# Patient Record
Sex: FEMALE | Race: WHITE | NOT HISPANIC OR LATINO | Employment: PART TIME | URBAN - METROPOLITAN AREA
[De-identification: names, ages, dates, MRNs, and addresses within clinical notes are randomized per-mention and may not be internally consistent; named-entity substitution may affect disease eponyms.]

---

## 2019-04-12 ENCOUNTER — OFFICE VISIT (OUTPATIENT)
Dept: URGENT CARE | Facility: MEDICAL CENTER | Age: 19
End: 2019-04-12
Payer: COMMERCIAL

## 2019-04-12 VITALS
TEMPERATURE: 98.4 F | DIASTOLIC BLOOD PRESSURE: 80 MMHG | WEIGHT: 212 LBS | HEART RATE: 85 BPM | RESPIRATION RATE: 16 BRPM | HEIGHT: 62 IN | OXYGEN SATURATION: 99 % | BODY MASS INDEX: 39.01 KG/M2 | SYSTOLIC BLOOD PRESSURE: 133 MMHG

## 2019-04-12 DIAGNOSIS — B35.4 TINEA CORPORIS: Primary | ICD-10-CM

## 2019-04-12 PROCEDURE — S9083 URGENT CARE CENTER GLOBAL: HCPCS | Performed by: FAMILY MEDICINE

## 2019-04-12 PROCEDURE — G0383 LEV 4 HOSP TYPE B ED VISIT: HCPCS | Performed by: FAMILY MEDICINE

## 2019-04-12 RX ORDER — KETOCONAZOLE 20 MG/G
CREAM TOPICAL DAILY
Qty: 15 G | Refills: 0 | Status: SHIPPED | OUTPATIENT
Start: 2019-04-12 | End: 2020-11-12 | Stop reason: ALTCHOICE

## 2019-10-04 ENCOUNTER — OFFICE VISIT (OUTPATIENT)
Dept: URGENT CARE | Facility: MEDICAL CENTER | Age: 19
End: 2019-10-04
Payer: COMMERCIAL

## 2019-10-04 VITALS
WEIGHT: 225.31 LBS | TEMPERATURE: 98.3 F | RESPIRATION RATE: 18 BRPM | HEIGHT: 62 IN | DIASTOLIC BLOOD PRESSURE: 82 MMHG | SYSTOLIC BLOOD PRESSURE: 114 MMHG | OXYGEN SATURATION: 100 % | BODY MASS INDEX: 41.46 KG/M2 | HEART RATE: 89 BPM

## 2019-10-04 DIAGNOSIS — H60.11 CELLULITIS OF AURICLE OF RIGHT EAR: Primary | ICD-10-CM

## 2019-10-04 PROCEDURE — 99212 OFFICE O/P EST SF 10 MIN: CPT | Performed by: PHYSICIAN ASSISTANT

## 2019-10-04 RX ORDER — CEPHALEXIN 500 MG/1
500 CAPSULE ORAL EVERY 8 HOURS SCHEDULED
Qty: 30 CAPSULE | Refills: 0 | Status: SHIPPED | OUTPATIENT
Start: 2019-10-04 | End: 2019-10-14

## 2019-10-04 NOTE — PROGRESS NOTES
[facility]        NAME: Khanh Barrios is a 22 y o  female  : 2000    MRN: [id number]  DATE: 2019  TIME: 8:06 AM    Assessment and Plan   Cellulitis of auricle of right ear [H60 11]  1  Cellulitis of auricle of right ear  cephalexin (KEFLEX) 500 mg capsule         Patient Instructions       Follow up with PCP in 3-5 days  Chief Complaint     Chief Complaint   Patient presents with    Ear Problem     Pt states she has had right ear peircing for 3 years and on wednesday developed redness, pain and swelling  Denies any new jewerly or trauma to ear  History of Present Illness       Patient has had an ear piercing in her upper outer cartilage of the right ear for 3 years  She has discomfort intermittently but yesterday started with discomfort around a 3 to 4/10  She notice of increasing redness and when she removed that there was some purulence discharge  No fever chills  No other associated symptoms  She did put the earring back in  She continues with discomfort but did clean the area this morning  No drainage at the present time  Review of Systems   Review of Systems   All other systems reviewed and are negative  Current Medications       Current Outpatient Medications:     cephalexin (KEFLEX) 500 mg capsule, Take 1 capsule (500 mg total) by mouth every 8 (eight) hours for 10 days, Disp: 30 capsule, Rfl: 0    ketoconazole (NIZORAL) 2 % cream, Apply topically daily, Disp: 15 g, Rfl: 0    Current Allergies     Allergies as of 10/04/2019    (No Known Allergies)            The following portions of the patient's history were reviewed and updated as appropriate: allergies, current medications, past family history, past medical history, past social history, past surgical history and problem list      History reviewed  No pertinent past medical history  History reviewed  No pertinent surgical history  History reviewed   No pertinent family history  Medications have been verified  Objective   /82 (BP Location: Left arm, Patient Position: Sitting)   Pulse 89   Temp 98 3 °F (36 8 °C) (Tympanic Core)   Resp 18   Ht 5' 2" (1 575 m)   Wt 102 kg (225 lb 5 oz)   LMP  (LMP Unknown)   SpO2 100%   BMI 41 21 kg/m²        Physical Exam     Physical Exam   Constitutional: She appears well-developed and well-nourished  Cardiovascular: Normal rate, regular rhythm and normal heart sounds  Pulmonary/Chest: Effort normal and breath sounds normal    Lymphadenopathy:     She has no cervical adenopathy  Nursing note and vitals reviewed  Right outer cartilage ear piercing and surrounding is a 2 in area of erythema

## 2020-02-20 ENCOUNTER — APPOINTMENT (OUTPATIENT)
Dept: LAB | Facility: CLINIC | Age: 20
End: 2020-02-20

## 2020-02-20 ENCOUNTER — OCCMED (OUTPATIENT)
Dept: URGENT CARE | Facility: CLINIC | Age: 20
End: 2020-02-20

## 2020-02-20 DIAGNOSIS — Z02.1 ENCOUNTER FOR PRE-EMPLOYMENT HEALTH SCREENING EXAMINATION: Primary | ICD-10-CM

## 2020-02-20 DIAGNOSIS — Z02.1 ENCOUNTER FOR PRE-EMPLOYMENT HEALTH SCREENING EXAMINATION: ICD-10-CM

## 2020-02-20 PROCEDURE — 36415 COLL VENOUS BLD VENIPUNCTURE: CPT

## 2020-02-20 PROCEDURE — 86480 TB TEST CELL IMMUN MEASURE: CPT

## 2020-02-21 LAB
GAMMA INTERFERON BACKGROUND BLD IA-ACNC: 0.01 IU/ML
M TB IFN-G BLD-IMP: NEGATIVE
M TB IFN-G CD4+ BCKGRND COR BLD-ACNC: 0 IU/ML
M TB IFN-G CD4+ BCKGRND COR BLD-ACNC: 0 IU/ML
MITOGEN IGNF BCKGRD COR BLD-ACNC: 4.53 IU/ML

## 2020-04-22 ENCOUNTER — NURSE TRIAGE (OUTPATIENT)
Dept: OTHER | Facility: OTHER | Age: 20
End: 2020-04-22

## 2020-04-22 DIAGNOSIS — Z20.822 SUSPECTED COVID-19 VIRUS INFECTION: ICD-10-CM

## 2020-04-22 DIAGNOSIS — Z20.822 SUSPECTED COVID-19 VIRUS INFECTION: Primary | ICD-10-CM

## 2020-04-22 PROCEDURE — 87635 SARS-COV-2 COVID-19 AMP PRB: CPT

## 2020-04-23 LAB
INPATIENT: NORMAL
SARS-COV-2 RNA SPEC QL NAA+PROBE: NOT DETECTED

## 2020-04-24 ENCOUNTER — TELEPHONE (OUTPATIENT)
Dept: FAMILY MEDICINE CLINIC | Facility: OTHER | Age: 20
End: 2020-04-24

## 2020-10-24 ENCOUNTER — NURSE TRIAGE (OUTPATIENT)
Dept: OTHER | Facility: OTHER | Age: 20
End: 2020-10-24

## 2020-10-24 DIAGNOSIS — Z20.828 EXPOSURE TO SARS-ASSOCIATED CORONAVIRUS: ICD-10-CM

## 2020-10-24 DIAGNOSIS — Z20.828 EXPOSURE TO SARS-ASSOCIATED CORONAVIRUS: Primary | ICD-10-CM

## 2020-10-24 PROCEDURE — U0003 INFECTIOUS AGENT DETECTION BY NUCLEIC ACID (DNA OR RNA); SEVERE ACUTE RESPIRATORY SYNDROME CORONAVIRUS 2 (SARS-COV-2) (CORONAVIRUS DISEASE [COVID-19]), AMPLIFIED PROBE TECHNIQUE, MAKING USE OF HIGH THROUGHPUT TECHNOLOGIES AS DESCRIBED BY CMS-2020-01-R: HCPCS | Performed by: FAMILY MEDICINE

## 2020-10-27 ENCOUNTER — TELEPHONE (OUTPATIENT)
Dept: OTHER | Facility: OTHER | Age: 20
End: 2020-10-27

## 2020-10-27 LAB — SARS-COV-2 RNA SPEC QL NAA+PROBE: NOT DETECTED

## 2020-11-12 ENCOUNTER — OFFICE VISIT (OUTPATIENT)
Dept: INTERNAL MEDICINE CLINIC | Facility: CLINIC | Age: 20
End: 2020-11-12
Payer: COMMERCIAL

## 2020-11-12 VITALS
BODY MASS INDEX: 39.16 KG/M2 | TEMPERATURE: 97.4 F | WEIGHT: 212.8 LBS | OXYGEN SATURATION: 98 % | HEIGHT: 62 IN | SYSTOLIC BLOOD PRESSURE: 120 MMHG | RESPIRATION RATE: 18 BRPM | DIASTOLIC BLOOD PRESSURE: 78 MMHG | HEART RATE: 90 BPM

## 2020-11-12 DIAGNOSIS — Z23 NEED FOR IMMUNIZATION AGAINST INFLUENZA: ICD-10-CM

## 2020-11-12 DIAGNOSIS — Z00.00 ANNUAL PHYSICAL EXAM: Primary | ICD-10-CM

## 2020-11-12 PROCEDURE — 3008F BODY MASS INDEX DOCD: CPT | Performed by: NURSE PRACTITIONER

## 2020-11-12 PROCEDURE — 90686 IIV4 VACC NO PRSV 0.5 ML IM: CPT

## 2020-11-12 PROCEDURE — 3725F SCREEN DEPRESSION PERFORMED: CPT | Performed by: NURSE PRACTITIONER

## 2020-11-12 PROCEDURE — 99385 PREV VISIT NEW AGE 18-39: CPT | Performed by: NURSE PRACTITIONER

## 2020-11-12 PROCEDURE — 90471 IMMUNIZATION ADMIN: CPT

## 2020-11-12 PROCEDURE — 1036F TOBACCO NON-USER: CPT | Performed by: NURSE PRACTITIONER

## 2020-11-12 RX ORDER — NORGESTIMATE AND ETHINYL ESTRADIOL 7DAYSX3 LO
1 KIT ORAL DAILY
COMMUNITY
Start: 2020-10-09 | End: 2022-03-24

## 2020-11-12 RX ORDER — PHENOL 1.4 %
1 AEROSOL, SPRAY (ML) MUCOUS MEMBRANE
COMMUNITY

## 2020-12-22 ENCOUNTER — APPOINTMENT (OUTPATIENT)
Dept: LAB | Age: 20
End: 2020-12-22

## 2020-12-22 ENCOUNTER — LAB (OUTPATIENT)
Dept: LAB | Age: 20
End: 2020-12-22

## 2020-12-22 ENCOUNTER — TRANSCRIBE ORDERS (OUTPATIENT)
Dept: ADMINISTRATIVE | Age: 20
End: 2020-12-22

## 2020-12-22 DIAGNOSIS — Z02.0 ENCOUNTER FOR SCHOOL HEALTH EXAMINATION: ICD-10-CM

## 2020-12-22 DIAGNOSIS — Z02.0 ENCOUNTER FOR SCHOOL HEALTH EXAMINATION: Primary | ICD-10-CM

## 2020-12-22 PROCEDURE — 86480 TB TEST CELL IMMUN MEASURE: CPT

## 2020-12-23 LAB
GAMMA INTERFERON BACKGROUND BLD IA-ACNC: 0.04 IU/ML
M TB IFN-G BLD-IMP: NEGATIVE
M TB IFN-G CD4+ BCKGRND COR BLD-ACNC: -0.01 IU/ML
M TB IFN-G CD4+ BCKGRND COR BLD-ACNC: 0 IU/ML
MITOGEN IGNF BCKGRD COR BLD-ACNC: 7.19 IU/ML

## 2020-12-28 ENCOUNTER — IMMUNIZATIONS (OUTPATIENT)
Dept: FAMILY MEDICINE CLINIC | Facility: HOSPITAL | Age: 20
End: 2020-12-28
Payer: COMMERCIAL

## 2020-12-28 DIAGNOSIS — Z23 ENCOUNTER FOR IMMUNIZATION: ICD-10-CM

## 2020-12-28 PROCEDURE — 0011A SARS-COV-2 / COVID-19 MRNA VACCINE (MODERNA) 100 MCG: CPT

## 2020-12-28 PROCEDURE — 91301 SARS-COV-2 / COVID-19 MRNA VACCINE (MODERNA) 100 MCG: CPT

## 2021-01-23 ENCOUNTER — IMMUNIZATIONS (OUTPATIENT)
Dept: FAMILY MEDICINE CLINIC | Facility: HOSPITAL | Age: 21
End: 2021-01-23

## 2021-01-23 DIAGNOSIS — Z23 ENCOUNTER FOR IMMUNIZATION: Primary | ICD-10-CM

## 2021-01-23 PROCEDURE — 0012A SARS-COV-2 / COVID-19 MRNA VACCINE (MODERNA) 100 MCG: CPT

## 2021-01-23 PROCEDURE — 91301 SARS-COV-2 / COVID-19 MRNA VACCINE (MODERNA) 100 MCG: CPT

## 2021-05-10 ENCOUNTER — TELEMEDICINE (OUTPATIENT)
Dept: INTERNAL MEDICINE CLINIC | Facility: CLINIC | Age: 21
End: 2021-05-10
Payer: COMMERCIAL

## 2021-05-10 VITALS — HEIGHT: 62 IN | WEIGHT: 201.2 LBS | TEMPERATURE: 98.2 F | BODY MASS INDEX: 37.02 KG/M2

## 2021-05-10 DIAGNOSIS — Z20.822 EXPOSURE TO COVID-19 VIRUS: Primary | ICD-10-CM

## 2021-05-10 DIAGNOSIS — Z20.822 EXPOSURE TO COVID-19 VIRUS: ICD-10-CM

## 2021-05-10 PROCEDURE — 1036F TOBACCO NON-USER: CPT | Performed by: NURSE PRACTITIONER

## 2021-05-10 PROCEDURE — U0005 INFEC AGEN DETEC AMPLI PROBE: HCPCS | Performed by: NURSE PRACTITIONER

## 2021-05-10 PROCEDURE — 3008F BODY MASS INDEX DOCD: CPT | Performed by: NURSE PRACTITIONER

## 2021-05-10 PROCEDURE — 99213 OFFICE O/P EST LOW 20 MIN: CPT | Performed by: NURSE PRACTITIONER

## 2021-05-10 PROCEDURE — U0003 INFECTIOUS AGENT DETECTION BY NUCLEIC ACID (DNA OR RNA); SEVERE ACUTE RESPIRATORY SYNDROME CORONAVIRUS 2 (SARS-COV-2) (CORONAVIRUS DISEASE [COVID-19]), AMPLIFIED PROBE TECHNIQUE, MAKING USE OF HIGH THROUGHPUT TECHNOLOGIES AS DESCRIBED BY CMS-2020-01-R: HCPCS | Performed by: NURSE PRACTITIONER

## 2021-05-10 NOTE — PROGRESS NOTES
COVID-19 Outpatient Progress Note    Assessment/Plan:    Problem List Items Addressed This Visit        Other    Exposure to COVID-19 virus - Primary     Sx onset Saturday with diarrhea, overnight developed sore throat and headaches  Temp today was 99 2, now afebrile with tylenol on board  Positive cough but denies sob/burrell, chest tightness  She has a decreased appetite and diarrhea but no n/v   Still has sense of smell/taste intact  Contact with sister Friday who had cold symptoms, she has not been tested for covid  Will test pt and she understands to quarantine until test can be reviewed  She can continue tylenol prn, mucinex if she develops worsening congestion or sputum  Of note, fully vaccinated against covid as of January  Relevant Orders    Novel Coronavirus (Covid-19),PCR [facility] - Collected at [facility] or [facility]         Disposition:     I referred patient to one of our centralized sites for a COVID-19 swab  I have spent 15 minutes directly with the patient  Greater than 50% of this time was spent in counseling/coordination of care regarding: prognosis, risks and benefits of treatment options, instructions for management, patient and family education, risk factor reductions and impressions  Encounter provider Linh Treviño, [facility]    Provider located at [place] [phone] [facility] [address]    Recent Visits  No visits were found meeting these conditions  Showing recent visits within past 7 days and meeting all other requirements     Today's Visits  Date Type Provider Dept   05/10/21 Telemedicine Kat Ortega, [address] Internal [facility]   Showing today's visits and meeting all other requirements     Future Appointments  No visits were found meeting these conditions     Showing future appointments within next 150 days and meeting all other requirements      This virtual check-in was done via [employer] and patient was informed that this is a secure, HIPAA-compliant platform  She agrees to proceed  Patient agrees to participate in a virtual check in via telephone or video visit instead of presenting to the office to address urgent/immediate medical needs  Patient is aware this is a billable service  After connecting through [facility], the patient was identified by name and date of birth  John Block was informed that this was a telemedicine visit and that the exam was being conducted confidentially over secure lines  My office door was closed  No one else was in the room  John Block acknowledged consent and understanding of privacy and security of the telemedicine visit  I informed the patient that I have reviewed her record in Epic and presented the opportunity for her to ask any questions regarding the visit today  The patient agreed to participate  Subjective:   John Block is a 23 y o  female who is concerned about COVID-19  Patient's symptoms include chills, fatigue, sore throat, cough, diarrhea and headache  Patient denies fever, malaise, congestion, rhinorrhea, anosmia, loss of taste, shortness of breath, chest tightness, abdominal pain, nausea, vomiting and myalgias       Date of symptom onset: 5/8/2021    Exposure:   Contact with a person who is under investigation (PUI) for or who is positive for COVID-19 within the last 14 days?: No    Hospitalized recently for fever and/or lower respiratory symptoms?: No      Currently a healthcare worker that is involved in direct patient care?: Yes      Works in a special setting where the risk of COVID-19 transmission may be high? (this may include long-term care, correctional and [facility]CI facilities; homeless shelters; assisted-living facilities and group homes ): No      Resident in a special setting where the risk of COVID-19 transmission may be high? (this may include long-term care, correctional and [facility]CI facilities; homeless shelters; assisted-living facilities and group homes ): No      Lab Results   Component Value Date    [address] [place] [address] Not Detected 10/24/2020     Past Medical History:   Diagnosis Date    Headache(784 0)     Obesity     PCOS (polycystic ovarian syndrome)      Past Surgical History:   Procedure Laterality Date    TONSILLECTOMY AND ADENOIDECTOMY       Current Outpatient Medications   Medication Sig Dispense Refill    Melatonin 10 MG TABS Take 1 tablet by mouth daily at bedtime      metFORMIN (GLUCOPHAGE) 500 mg tablet Take 500 mg by mouth 2 (two) times a day      norgestimate-ethinyl estradiol (ORTHO TRI-CYCLEN LO) 0 18/0 215/0 25 MG-25 MCG per tablet Take 1 tablet by mouth daily       No current facility-administered medications for this visit  No Known Allergies    Review of Systems   Constitutional: Positive for chills and fatigue  Negative for fever  HENT: Positive for sore throat  Negative for congestion and rhinorrhea  Respiratory: Positive for cough  Negative for chest tightness and shortness of breath  Gastrointestinal: Positive for diarrhea  Negative for abdominal pain, nausea and vomiting  Musculoskeletal: Negative for myalgias  Neurological: Positive for headaches  Objective:    Vitals:    05/10/21 0932   Temp: 98 2 °F (36 8 °C)   Weight: 91 3 kg (201 lb 3 2 oz)   Height: 5' 2" (1 575 m)       Physical Exam  Vitals signs reviewed  Constitutional:       General: She is awake  She is not in acute distress  Appearance: Normal appearance  She is well-developed  She is not ill-appearing or toxic-appearing  Eyes:      General: Lids are normal       Conjunctiva/sclera: Conjunctivae normal    Pulmonary:      Effort: Pulmonary effort is normal  No tachypnea, accessory muscle usage or respiratory distress  Neurological:      Mental Status: She is alert and oriented to person, place, and time     Psychiatric:         Attention and Perception: Attention normal          Mood and Affect: Mood normal  Speech: Speech normal          Behavior: Behavior normal  Behavior is cooperative  Thought Content: Thought content normal          Cognition and Memory: Cognition normal          Judgment: Judgment normal        VIRTUAL VISIT DISCLAIMER    Milind Cook acknowledges that she has consented to an online visit or consultation  She understands that the online visit is based solely on information provided by her, and that, in the absence of a face-to-face physical evaluation by the physician, the diagnosis she receives is both limited and provisional in terms of accuracy and completeness  This is not intended to replace a full medical face-to-face evaluation by the physician  Milind Cook understands and accepts these terms

## 2021-05-10 NOTE — ASSESSMENT & PLAN NOTE
Sx onset Saturday with diarrhea, overnight developed sore throat and headaches  Temp today was 99 2, now afebrile with tylenol on board  Positive cough but denies sob/burrell, chest tightness  She has a decreased appetite and diarrhea but no n/v   Still has sense of smell/taste intact  Contact with sister Friday who had cold symptoms, she has not been tested for covid  Will test pt and she understands to quarantine until test can be reviewed  She can continue tylenol prn, mucinex if she develops worsening congestion or sputum  Of note, fully vaccinated against covid as of January

## 2021-05-11 LAB — SARS-COV-2 RNA RESP QL NAA+PROBE: NEGATIVE

## 2022-01-13 ENCOUNTER — TELEPHONE (OUTPATIENT)
Dept: INTERNAL MEDICINE CLINIC | Facility: CLINIC | Age: 22
End: 2022-01-13

## 2022-01-13 DIAGNOSIS — R05.9 COUGH: Primary | ICD-10-CM

## 2022-01-13 NOTE — TELEPHONE ENCOUNTER
Covid exposed live in  Cough, sore throat, congestion, sinus pressure, body aches, headaches, no fever-99,   Onset 99 0   Please put in for test

## 2022-01-14 PROCEDURE — U0005 INFEC AGEN DETEC AMPLI PROBE: HCPCS | Performed by: NURSE PRACTITIONER

## 2022-01-14 PROCEDURE — U0003 INFECTIOUS AGENT DETECTION BY NUCLEIC ACID (DNA OR RNA); SEVERE ACUTE RESPIRATORY SYNDROME CORONAVIRUS 2 (SARS-COV-2) (CORONAVIRUS DISEASE [COVID-19]), AMPLIFIED PROBE TECHNIQUE, MAKING USE OF HIGH THROUGHPUT TECHNOLOGIES AS DESCRIBED BY CMS-2020-01-R: HCPCS | Performed by: NURSE PRACTITIONER

## 2022-01-17 ENCOUNTER — TELEPHONE (OUTPATIENT)
Dept: INTERNAL MEDICINE CLINIC | Facility: CLINIC | Age: 22
End: 2022-01-17

## 2022-01-17 NOTE — TELEPHONE ENCOUNTER
I called patient and gave her the results and she does not want a virtual appointment and she said she is starting to feel better

## 2022-01-19 ENCOUNTER — TELEPHONE (OUTPATIENT)
Dept: INTERNAL MEDICINE CLINIC | Facility: CLINIC | Age: 22
End: 2022-01-19

## 2022-01-19 ENCOUNTER — TELEMEDICINE (OUTPATIENT)
Dept: INTERNAL MEDICINE CLINIC | Facility: CLINIC | Age: 22
End: 2022-01-19
Payer: COMMERCIAL

## 2022-01-19 VITALS — TEMPERATURE: 97.9 F

## 2022-01-19 DIAGNOSIS — U07.1 COVID: Primary | ICD-10-CM

## 2022-01-19 PROCEDURE — 99213 OFFICE O/P EST LOW 20 MIN: CPT | Performed by: INTERNAL MEDICINE

## 2022-01-19 PROCEDURE — 1036F TOBACCO NON-USER: CPT | Performed by: INTERNAL MEDICINE

## 2022-01-19 NOTE — ASSESSMENT & PLAN NOTE
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  Initially she was having some muscle aches and pains still some slight muscle aches in her neck  The also originally did have some problems with loose stools instill her stools are not completely formed at this point time  Most importantly she has residual cough which she states this point time is nonproductive no shortness of breath  She has no abdominal pain or cramps  She is taking over-the-counter cough medicine specifically Robitussin which she states does help  Appetite has improved  With the patient having COVID virus and being still symptomatic even though she has had the vaccine she was told that she cannot return to work until resolution of symptoms  Was told continue with keeping hydrated, maintaining her nutrition continue with the vitamin-C vitamin-D , zinc and suggested 81 mg aspirin daily with food    She will call the office if any new problems or concerns

## 2022-01-19 NOTE — PROGRESS NOTES
COVID-19 Outpatient Progress Note    Assessment/Plan:    Problem List Items Addressed This Visit     None         Disposition:     I recommended self-quarantine for 10 days and to watch for symptoms until 14 days after exposure  If patient were to develop symptoms, they should self isolate and call our office for further guidance  I have spent 20 minutes directly with the patient  Greater than 50% of this time was spent in counseling/coordination of care regarding: diagnostic results, prognosis, risks and benefits of treatment options, instructions for management, patient and family education, importance of treatment compliance, risk factor reductions and impressions  Encounter provider Vidal Avila DO    Provider located at [address] [address]    Recent Visits  Date Type Provider Dept   01/17/22 Telephone [facility], [address] Internal Med   01/13/22 Telephone [facility], [address] Internal [facility]   Showing recent visits within past 7 days and meeting all other requirements  Today's Visits  Date Type Provider Dept   01/19/22 Telemedicine Vidal Avila, [address] Internal Med   Showing today's visits and meeting all other requirements  Future Appointments  No visits were found meeting these conditions  Showing future appointments within next 150 days and meeting all other requirements     This virtual check-in was done via [employer] and patient was informed that this is a secure, HIPAA-compliant platform  She agrees to proceed  Patient agrees to participate in a virtual check in via telephone or video visit instead of presenting to the office to address urgent/immediate medical needs  Patient is aware this is a billable service  After connecting through [facility], the patient was identified by name and date of birth   Radha Shore was informed that this was a telemedicine visit and that the exam was being conducted confidentially over secure lines  Yumi Carlson acknowledged consent and understanding of privacy and security of the telemedicine visit  I informed the patient that I have reviewed her record in Epic and presented the opportunity for her to ask any questions regarding the visit today  The patient agreed to participate  Verification of patient location:  Patient is located in the following state in which I hold an active license: [place]    Subjective:   Yumi Carlson is a 19 y o  female who is concerned about COVID-19  Patient's symptoms include fatigue, nasal congestion, cough, diarrhea, myalgias and headache  Patient denies fever, chills, malaise, rhinorrhea, sore throat, anosmia, loss of taste, shortness of breath, chest tightness, abdominal pain, nausea and vomiting           COVID-19 vaccination status: Fully vaccinated (primary series)    Exposure:   Contact with a person who is under investigation (PUI) for or who is positive for COVID-19 within the last 14 days?: Yes    Hospitalized recently for fever and/or lower respiratory symptoms?: No      Currently a healthcare worker that is involved in direct patient care?: No      Works in a special setting where the risk of COVID-19 transmission may be high? (this may include long-term care, correctional and prison facilities; homeless shelters; assisted-living facilities and group homes ): No      Resident in a special setting where the risk of COVID-19 transmission may be high? (this may include long-term care, correctional and prison facilities; homeless shelters; assisted-living facilities and group homes ): No      Lab Results   Component Value Date    SARSCOV2 Positive (A) 01/14/2022    [address] [facility] 98 Not Detected 10/24/2020     Past Medical History:   Diagnosis Date    Headache(784 0)     Obesity     PCOS (polycystic ovarian syndrome)      Past Surgical History:   Procedure Laterality Date    TONSILLECTOMY AND ADENOIDECTOMY       Current Outpatient Medications   Medication Sig Dispense Refill    Melatonin 10 MG TABS Take 1 tablet by mouth daily at bedtime      metFORMIN (GLUCOPHAGE) 500 mg tablet Take 500 mg by mouth 2 (two) times a day      norgestimate-ethinyl estradiol (ORTHO TRI-CYCLEN LO) 0 18/0 215/0 25 MG-25 MCG per tablet Take 1 tablet by mouth daily       No current facility-administered medications for this visit  No Known Allergies    Review of Systems   Constitutional: Positive for fatigue  Negative for chills and fever  HENT: Positive for congestion  Negative for rhinorrhea and sore throat  Respiratory: Positive for cough  Negative for chest tightness and shortness of breath  Gastrointestinal: Positive for diarrhea  Negative for abdominal pain, nausea and vomiting  Musculoskeletal: Positive for myalgias  Neurological: Positive for headaches  Objective:    Vitals:    01/19/22 0909   Temp: 97 9 °F (36 6 °C)       Physical Exam  Vitals and nursing note reviewed  Constitutional:       General: She is not in acute distress  Appearance: Normal appearance  She is not ill-appearing, toxic-appearing or diaphoretic  Comments: The patient contacted by video telephone  Is awake alert sounds congested in no acute distress, articulate no respiratory compromise noted during exam other than persistent episodic cough   HENT:      Head: Normocephalic and atraumatic  Neurological:      Mental Status: She is alert and oriented to person, place, and time  Psychiatric:         Mood and Affect: Mood normal          Behavior: Behavior normal          Thought Content: Thought content normal          Judgment: Judgment normal          VIRTUAL VISIT DISCLAIMER    Soto Rickicinthia verbally agrees to participate in [employer]   Pt is aware that Virtual Care Services could be limited without vital signs or the ability to perform a full hands-on physical exam  Lety Schroeder understands she or the provider may request at any time to terminate the video visit and request the patient to seek care or treatment in person

## 2022-01-19 NOTE — TELEPHONE ENCOUNTER
Patient had a virtual today and she asked if we can please write a school note that states she was unable to return to school today due to symptoms and she has to remain out until she is symptom free  She asked if she can see it in my chart and if she cannot she asked if we can please email to [email or web address]    Thank you

## 2022-03-22 ENCOUNTER — NURSE TRIAGE (OUTPATIENT)
Dept: OTHER | Facility: OTHER | Age: 22
End: 2022-03-22

## 2022-03-23 ENCOUNTER — TELEPHONE (OUTPATIENT)
Dept: INTERNAL MEDICINE CLINIC | Facility: CLINIC | Age: 22
End: 2022-03-23

## 2022-03-23 NOTE — TELEPHONE ENCOUNTER
Regarding: gallbladder pain   ----- Message from Katelin Lino sent at 3/22/2022  7:56 PM EDT -----  " I maybe having an issue with my gallbladder, I would like to talk to someone to see if its serious "

## 2022-03-23 NOTE — TELEPHONE ENCOUNTER
Reason for Disposition   [1] MILD pain (e g , does not interfere with normal activities) AND [2] pain comes and goes (cramps) AND [3] present > 48 hours  (Exception: this same abdominal pain is a chronic symptom recurrent or ongoing AND present > 4 weeks)    Answer Assessment - Initial Assessment Questions  1  LOCATION: "Where does it hurt?"       Right upper abdomen, right below rib cage, has also felt pain under sternum     2  RADIATION: "Does the pain shoot anywhere else?" (e g , chest, back)      Unsure, has had upper and lower back pain for about a week     3  ONSET: "When did the pain begin?" (e g , minutes, hours or days ago)       Within the last 2 days but not as noticeable as today     4  SUDDEN: "Gradual or sudden onset?"      States it feels more sudden mainly today    5  PATTERN "Does the pain come and go, or is it constant?"     - If constant: "Is it getting better, staying the same, or worsening?"       (Note: Constant means the pain never goes away completely; most serious pain is constant and it progresses)      - If intermittent: "How long does it last?" "Do you have pain now?"      (Note: Intermittent means the pain goes away completely between bouts)        Dull stabbing pain every once in a while, has happened once or twice today     6  SEVERITY: "How bad is the pain?"  (e g , Scale 1-10; mild, moderate, or severe)    - MILD (1-3): doesn't interfere with normal activities, abdomen soft and not tender to touch     - MODERATE (4-7): interferes with normal activities or awakens from sleep, tender to touch     - SEVERE (8-10): excruciating pain, doubled over, unable to do any normal activities       2/10 when the pain occurs     7  RECURRENT SYMPTOM: "Have you ever had this type of stomach pain before?" If Yes, ask: "When was the last time?" and "What happened that time?"       Denies       8   RELIEVING/AGGRAVATING FACTORS: "What makes it better or worse?" (e g , movement, antacids, bowel movement)      Notices more when sitting still     9  OTHER SYMPTOMS: "Has there been any vomiting, diarrhea, constipation, or urine problems?"        Nausea the last couple of weeks, back pain, has also felt pain in lower abdomen not in relation to upper     Past week has been dealing with a lot of anxiety    10   PREGNANCY: "Is there any chance you are pregnant?" "When was your last menstrual period?"        Denies    Protocols used: ABDOMINAL PAIN - [facility]

## 2022-03-23 NOTE — TELEPHONE ENCOUNTER
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

## 2022-03-23 NOTE — TELEPHONE ENCOUNTER
Spoke with pt regarding symptoms of some abdominal pain and back pain  She does not feel sx are getting actively worse  She is unable to come to the office today, but would be available tomorrow  Will have staff call her to scheduled, reinforced that if anything worsens she should go to the urgent care

## 2022-03-24 ENCOUNTER — OFFICE VISIT (OUTPATIENT)
Dept: FAMILY MEDICINE CLINIC | Facility: CLINIC | Age: 22
End: 2022-03-24
Payer: COMMERCIAL

## 2022-03-24 VITALS
RESPIRATION RATE: 16 BRPM | HEIGHT: 62 IN | OXYGEN SATURATION: 99 % | HEART RATE: 104 BPM | DIASTOLIC BLOOD PRESSURE: 88 MMHG | TEMPERATURE: 98.9 F | BODY MASS INDEX: 40.23 KG/M2 | SYSTOLIC BLOOD PRESSURE: 130 MMHG | WEIGHT: 218.6 LBS

## 2022-03-24 DIAGNOSIS — F32.A ANXIETY AND DEPRESSION: Primary | ICD-10-CM

## 2022-03-24 DIAGNOSIS — Z13.220 SCREENING FOR LIPID DISORDERS: ICD-10-CM

## 2022-03-24 DIAGNOSIS — E28.2 PCOS (POLYCYSTIC OVARIAN SYNDROME): ICD-10-CM

## 2022-03-24 DIAGNOSIS — F41.9 ANXIETY AND DEPRESSION: Primary | ICD-10-CM

## 2022-03-24 DIAGNOSIS — R10.30 LOWER ABDOMINAL PAIN: ICD-10-CM

## 2022-03-24 PROCEDURE — 3725F SCREEN DEPRESSION PERFORMED: CPT | Performed by: NURSE PRACTITIONER

## 2022-03-24 PROCEDURE — 1036F TOBACCO NON-USER: CPT | Performed by: NURSE PRACTITIONER

## 2022-03-24 PROCEDURE — 99214 OFFICE O/P EST MOD 30 MIN: CPT | Performed by: NURSE PRACTITIONER

## 2022-03-24 PROCEDURE — 3008F BODY MASS INDEX DOCD: CPT | Performed by: NURSE PRACTITIONER

## 2022-03-24 RX ORDER — ESCITALOPRAM OXALATE 10 MG/1
10 TABLET ORAL DAILY
Qty: 30 TABLET | Refills: 0 | Status: SHIPPED | OUTPATIENT
Start: 2022-03-24 | End: 2022-05-06

## 2022-03-24 NOTE — ASSESSMENT & PLAN NOTE
Intermittent, LLQ, RLQ, sides of trunk  Not exacerbated by PO intake, normal exam   Will check cmp, cbc  Increased anxiety last week may have caused dysregulation of bowels which may have caused these intermittent pains  Will be treating anxiety as above, will f/u in 1 month  Pt knows to call if abd symptoms worsen

## 2022-03-24 NOTE — PROGRESS NOTES
Assessment/Plan:  BMI Counseling: Body mass index is 39 98 kg/m²  The BMI is above normal  Nutrition recommendations include decreasing portion sizes, encouraging healthy choices of fruits and vegetables, consuming healthier snacks, moderation in carbohydrate intake and increasing intake of lean protein  Exercise recommendations include moderate physical activity 150 minutes/week, exercising 3-5 times per week and strength training exercises  No pharmacotherapy was ordered  Rationale for BMI follow-up plan is due to patient being overweight or obese  Depression Screening and Follow-up Plan: Patient's depression screening was positive with a PHQ-2 score of 3  Their PHQ-9 score was 11  Patient assessed for underlying major depression  Brief counseling provided and recommend additional follow-up/re-evaluation next office visit  PCOS (polycystic ovarian syndrome)  Hx of PCOS, not currently treated  Gyn plans to initiate metformin but is awaiting review of her a1c  She asked if this could be ordered for her, order provided  F/u with gyn  Anxiety and depression  Increasing anxiety about health with her mother recently being diagnosed with colon cancer, positive PHQ score  Pt has been having abdominal pains that come and go, some back pain  May be 2/2 increased anxiety and the associated change in her appetite  She is interested in trying medication, will start her on escitalopram 10 mg, reviewed how to take, side effects  She understands it takes several weeks to reach effect  We will f/u in 1 month, she should call with questions or concerns  Lower abdominal pain  Intermittent, LLQ, RLQ, sides of trunk  Not exacerbated by PO intake, normal exam   Will check cmp, cbc  Increased anxiety last week may have caused dysregulation of bowels which may have caused these intermittent pains  Will be treating anxiety as above, will f/u in 1 month  Pt knows to call if abd symptoms worsen         Diagnoses and all orders for this visit:    Anxiety and depression  -     escitalopram (LEXAPRO) 10 mg tablet; Take 1 tablet (10 mg total) by mouth daily  -     Comprehensive metabolic panel; Future  -     CBC and differential; Future  -     TSH, 3rd generation with Free T4 reflex; Future    PCOS (polycystic ovarian syndrome)  -     Hemoglobin A1C; Future    Screening for lipid disorders  -     Lipid panel; Future    Lower abdominal pain          Subjective:      Patient ID: Krissy Magallanes is a 19 y o  female  Pt is a 20 yo female seen today for evaluation of abdominal pain and back pain  PMH includes obesity, PCOS  She notes increased anxiety and panic attacks last week, which has since subsided a bit  She started about 2 days ago with RUQ pain that has since resolved, but now she has a discomfort and pulling sensation bilateral sides of the trunk  She also has sharper pain in the R and L lower quadrants; these pains come and go, lasting a few seconds  She also has upper back pain bilaterally, described as soreness that is worse in certain postures  She did seem to be a bit constipated yesterday, noting small BM, she has had normal BM, this did not provide her with any noticeable relief  She noted nausea last week with the increased anxiety but it is back to normal   No vomiting  Appetite is normal currently  She denies increased belching or flatulence  No fever or chills  Denies urinary symptoms  She denies significant change in diet  The following portions of the patient's history were reviewed and updated as appropriate: allergies, current medications, past family history, past medical history, past social history, past surgical history and problem list     Review of Systems   Constitutional: Negative for appetite change, chills, diaphoresis, fatigue and fever  Respiratory: Negative for shortness of breath  Cardiovascular: Negative for chest pain and palpitations     Gastrointestinal: Positive for abdominal pain  Negative for abdominal distention, blood in stool, constipation, diarrhea, nausea and vomiting  Genitourinary: Negative for difficulty urinating, dysuria and frequency  Musculoskeletal: Positive for back pain  Psychiatric/Behavioral: Positive for dysphoric mood  The patient is nervous/anxious  Objective:      /88 (BP Location: Left arm)   Pulse 104   Temp 98 9 °F (37 2 °C)   Resp 16   Ht 5' 2" (1 575 m)   Wt 99 2 kg (218 lb 9 6 oz)   SpO2 99%   BMI 39 98 kg/m²          Physical Exam  Vitals reviewed  Constitutional:       General: She is awake  She is not in acute distress  Appearance: Normal appearance  She is well-developed and well-groomed  She is not ill-appearing  Cardiovascular:      Rate and Rhythm: Normal rate and regular rhythm  Pulses: Normal pulses  Heart sounds: Normal heart sounds  No murmur heard  Pulmonary:      Effort: Pulmonary effort is normal       Breath sounds: Normal breath sounds  Abdominal:      General: Bowel sounds are normal       Palpations: Abdomen is soft  Tenderness: There is no abdominal tenderness  There is no right CVA tenderness or left CVA tenderness  Skin:     General: Skin is warm and dry  Neurological:      Mental Status: She is alert and oriented to person, place, and time  Psychiatric:         Attention and Perception: Attention normal          Mood and Affect: Mood normal          Speech: Speech normal          Behavior: Behavior normal  Behavior is cooperative  Thought Content:  Thought content normal          Cognition and Memory: Cognition normal          Judgment: Judgment normal

## 2022-03-24 NOTE — ASSESSMENT & PLAN NOTE
Hx of PCOS, not currently treated  Gyn plans to initiate metformin but is awaiting review of her a1c  She asked if this could be ordered for her, order provided  F/u with gyn

## 2022-03-24 NOTE — ASSESSMENT & PLAN NOTE
Increasing anxiety about health with her mother recently being diagnosed with colon cancer, positive PHQ score  Pt has been having abdominal pains that come and go, some back pain  May be 2/2 increased anxiety and the associated change in her appetite  She is interested in trying medication, will start her on escitalopram 10 mg, reviewed how to take, side effects  She understands it takes several weeks to reach effect  We will f/u in 1 month, she should call with questions or concerns

## 2022-03-25 ENCOUNTER — TELEPHONE (OUTPATIENT)
Dept: ADMINISTRATIVE | Facility: OTHER | Age: 22
End: 2022-03-25

## 2022-03-25 NOTE — TELEPHONE ENCOUNTER
----- Message from Cornelius Lafleur sent at 3/24/2022  2:25 PM EDT -----  Pt is utd with cervical cancer screening, gyn is through [facility]

## 2022-03-25 NOTE — TELEPHONE ENCOUNTER
Upon review of the In Basket request we were able to locate, review, and update the patient chart as requested for Pap Smear (HPV) aka Cervical Cancer Screening  Any additional questions or concerns should be emailed to the Practice Liaisons via [email or web address] email, please do not reply via In Basket      Thank you  Finn Durand

## 2022-05-06 DIAGNOSIS — F41.9 ANXIETY AND DEPRESSION: ICD-10-CM

## 2022-05-06 DIAGNOSIS — F32.A ANXIETY AND DEPRESSION: ICD-10-CM

## 2022-05-06 RX ORDER — ESCITALOPRAM OXALATE 10 MG/1
TABLET ORAL
Qty: 30 TABLET | Refills: 0 | Status: SHIPPED | OUTPATIENT
Start: 2022-05-06 | End: 2022-06-10

## 2022-06-10 DIAGNOSIS — F32.A ANXIETY AND DEPRESSION: ICD-10-CM

## 2022-06-10 DIAGNOSIS — F41.9 ANXIETY AND DEPRESSION: ICD-10-CM

## 2022-06-10 RX ORDER — ESCITALOPRAM OXALATE 10 MG/1
TABLET ORAL
Qty: 30 TABLET | Refills: 0 | Status: SHIPPED | OUTPATIENT
Start: 2022-06-10 | End: 2022-07-14 | Stop reason: SDUPTHER

## 2022-07-13 NOTE — LETTER
[address] [facility]  [address] [facility] [address]  Phone#  [phone]  Fax#  [phone]      May 10, 2021     Patient: Sarah Cruz  YOB: 2000  Date of Last Encounter: 11/12/2020    To whom it may concern:    Sarah Cruz was seen for evaluation of covid symptoms today  She is being tested for covid and is advised to quarantine until her test result can be reviewed  She may return to work/ school if her test is negative as long as she remains afebrile       Sincerely,       MEDARDO Chand
Plan: Treated with [facility] W/ Dr. Reese
Detail Level: Zone

## 2022-07-14 DIAGNOSIS — F41.9 ANXIETY AND DEPRESSION: ICD-10-CM

## 2022-07-14 DIAGNOSIS — F32.A ANXIETY AND DEPRESSION: ICD-10-CM

## 2022-07-15 RX ORDER — ESCITALOPRAM OXALATE 10 MG/1
10 TABLET ORAL DAILY
Qty: 30 TABLET | Refills: 0 | Status: SHIPPED | OUTPATIENT
Start: 2022-07-15 | End: 2022-08-19

## 2022-08-19 DIAGNOSIS — F32.A ANXIETY AND DEPRESSION: ICD-10-CM

## 2022-08-19 DIAGNOSIS — F41.9 ANXIETY AND DEPRESSION: ICD-10-CM

## 2022-08-19 RX ORDER — ESCITALOPRAM OXALATE 10 MG/1
TABLET ORAL
Qty: 30 TABLET | Refills: 0 | Status: SHIPPED | OUTPATIENT
Start: 2022-08-19 | End: 2022-09-19 | Stop reason: SDUPTHER

## 2022-09-19 DIAGNOSIS — F41.9 ANXIETY AND DEPRESSION: ICD-10-CM

## 2022-09-19 DIAGNOSIS — F32.A ANXIETY AND DEPRESSION: ICD-10-CM

## 2022-09-19 RX ORDER — ESCITALOPRAM OXALATE 10 MG/1
10 TABLET ORAL DAILY
Qty: 30 TABLET | Refills: 0 | Status: SHIPPED | OUTPATIENT
Start: 2022-09-19 | End: 2022-10-25 | Stop reason: SDUPTHER

## 2022-09-23 ENCOUNTER — OFFICE VISIT (OUTPATIENT)
Dept: URGENT CARE | Age: 22
End: 2022-09-23
Payer: COMMERCIAL

## 2022-09-23 VITALS
RESPIRATION RATE: 20 BRPM | HEART RATE: 103 BPM | TEMPERATURE: 97.2 F | SYSTOLIC BLOOD PRESSURE: 124 MMHG | DIASTOLIC BLOOD PRESSURE: 86 MMHG | OXYGEN SATURATION: 99 %

## 2022-09-23 DIAGNOSIS — R21 RASH: Primary | ICD-10-CM

## 2022-09-23 PROCEDURE — 99213 OFFICE O/P EST LOW 20 MIN: CPT

## 2022-09-23 RX ORDER — METHYLPREDNISOLONE 4 MG/1
TABLET ORAL
Qty: 21 TABLET | Refills: 0 | Status: SHIPPED | OUTPATIENT
Start: 2022-09-23 | End: 2022-09-29

## 2022-09-24 NOTE — PROGRESS NOTES
[address]        NAME: Nikolay Chappell is a 23 y o  female  : 2000    MRN: [id number]  DATE: 2022  TIME: 8:37 PM    Assessment and Plan   Rash [R21]  1  Rash  methylPREDNISolone 4 MG tablet therapy pack         Patient Instructions     Steroid taper as discussed  Follow up with PCP in 3-5 days  Proceed to  ER if symptoms worsen  Chief Complaint     Chief Complaint   Patient presents with    Rash     Rash on abdomen and bilateral arms and patient notice it today  History of Present Illness       Patient presenting for evaluation of acute rash  She states that she noticed the rash under her arms and on her abdomen this morning  She denies any drainage or bleeding from the area, but states that it is pruritic  She denies any new lotions, soaps, detergents or medications, but states that she has a new job that she started this week and has to wear hospital issued scrubs  She denies any chest pain, shortness or breath, fever or chills  Rash  Pertinent negatives include no cough, fever, shortness of breath, sore throat or vomiting  Review of Systems   Review of Systems   Constitutional: Negative for chills and fever  HENT: Negative for ear pain and sore throat  Eyes: Negative for pain and visual disturbance  Respiratory: Negative for cough and shortness of breath  Cardiovascular: Negative for chest pain and palpitations  Gastrointestinal: Negative for abdominal pain and vomiting  Genitourinary: Negative for dysuria and hematuria  Musculoskeletal: Negative for arthralgias and back pain  Skin: Positive for rash  Negative for color change  Neurological: Negative for seizures and syncope  All other systems reviewed and are negative          Current Medications       Current Outpatient Medications:     methylPREDNISolone 4 MG tablet therapy pack, Take 6 tablets (24 mg total) by mouth daily for 1 day, THEN 5 tablets (20 mg total) daily for 1 day, THEN 4 tablets (16 mg total) daily for 1 day, THEN 3 tablets (12 mg total) daily for 1 day, THEN 2 tablets (8 mg total) daily for 1 day, THEN 1 tablet (4 mg total) daily for 1 day  Use as directed on package  , Disp: 21 tablet, Rfl: 0    escitalopram (LEXAPRO) 10 mg tablet, Take 1 tablet (10 mg total) by mouth daily, Disp: 30 tablet, Rfl: 0    Melatonin 10 MG TABS, Take 1 tablet by mouth daily at bedtime (Patient not taking: Reported on 3/24/2022 ), Disp: , Rfl:     metFORMIN (GLUCOPHAGE) 500 mg tablet, Take 500 mg by mouth 2 (two) times a day (Patient not taking: Reported on 3/24/2022 ), Disp: , Rfl:     norgestimate-ethinyl estradiol (ORTHO TRI-CYCLEN LO) 0 18/0 215/0 25 MG-25 MCG per tablet, Take 1 tablet by mouth daily, Disp: , Rfl:     Current Allergies     Allergies as of 09/23/2022    (No Known Allergies)            The following portions of the patient's history were reviewed and updated as appropriate: allergies, current medications, past family history, past medical history, past social history, past surgical history and problem list      Past Medical History:   Diagnosis Date    Headache(784 0)     Obesity     PCOS (polycystic ovarian syndrome)        Past Surgical History:   Procedure Laterality Date    TONSILLECTOMY AND ADENOIDECTOMY         Family History   Problem Relation Age of Onset    Endometriosis Mother     No Known Problems Father     No Known Problems Sister     No Known Problems Brother     No Known Problems Maternal Grandmother     Lung cancer Maternal Grandfather     No Known Problems Paternal Grandmother     No Known Problems Paternal Grandfather     No Known Problems Sister          Medications have been verified  Objective   /86   Pulse 103   Temp (!) 97 2 °F (36 2 °C) (Temporal)   Resp 20   SpO2 99%        Physical Exam     Physical Exam  Vitals and nursing note reviewed  Constitutional:       General: She is not in acute distress       Appearance: Normal appearance  She is not ill-appearing, toxic-appearing or diaphoretic  HENT:      Head: Normocephalic and atraumatic  Right Ear: Tympanic membrane normal       Left Ear: Tympanic membrane normal       Nose: Nose normal  No congestion or rhinorrhea  Mouth/Throat:      Mouth: Mucous membranes are moist       Pharynx: Oropharynx is clear  No oropharyngeal exudate or posterior oropharyngeal erythema  Eyes:      General:         Right eye: No discharge  Left eye: No discharge  Extraocular Movements: Extraocular movements intact  Conjunctiva/sclera: Conjunctivae normal       Pupils: Pupils are equal, round, and reactive to light  Cardiovascular:      Rate and Rhythm: Normal rate and regular rhythm  Pulses: Normal pulses  Heart sounds: Normal heart sounds  No murmur heard  No friction rub  No gallop  Pulmonary:      Effort: Pulmonary effort is normal  No respiratory distress  Breath sounds: Normal breath sounds  No stridor  No wheezing, rhonchi or rales  Chest:      Chest wall: No tenderness  Abdominal:      General: Abdomen is flat  Bowel sounds are normal       Palpations: Abdomen is soft  Tenderness: There is no abdominal tenderness  There is no guarding or rebound  Musculoskeletal:         General: No tenderness  Normal range of motion  Cervical back: Normal range of motion and neck supple  No tenderness  Skin:     General: Skin is warm and dry  Capillary Refill: Capillary refill takes less than 2 seconds  Findings: Rash present  Rash is papular  Neurological:      General: No focal deficit present  Mental Status: She is alert and oriented to person, place, and time     Psychiatric:         Mood and Affect: Mood normal          Behavior: Behavior normal

## 2022-10-05 ENCOUNTER — OFFICE VISIT (OUTPATIENT)
Dept: FAMILY MEDICINE CLINIC | Facility: CLINIC | Age: 22
End: 2022-10-05
Payer: COMMERCIAL

## 2022-10-05 VITALS
RESPIRATION RATE: 16 BRPM | WEIGHT: 230.4 LBS | HEART RATE: 96 BPM | SYSTOLIC BLOOD PRESSURE: 122 MMHG | HEIGHT: 62 IN | OXYGEN SATURATION: 97 % | BODY MASS INDEX: 42.4 KG/M2 | TEMPERATURE: 97.4 F | DIASTOLIC BLOOD PRESSURE: 90 MMHG

## 2022-10-05 DIAGNOSIS — L42 PITYRIASIS ROSEA: Primary | ICD-10-CM

## 2022-10-05 PROCEDURE — 99213 OFFICE O/P EST LOW 20 MIN: CPT | Performed by: NURSE PRACTITIONER

## 2022-10-05 RX ORDER — TRIAMCINOLONE ACETONIDE 1 MG/G
CREAM TOPICAL 2 TIMES DAILY
Qty: 30 G | Refills: 0 | Status: SHIPPED | OUTPATIENT
Start: 2022-10-05

## 2022-10-05 RX ORDER — HYDROXYZINE PAMOATE 25 MG/1
25 CAPSULE ORAL 3 TIMES DAILY PRN
Qty: 30 CAPSULE | Refills: 0 | Status: SHIPPED | OUTPATIENT
Start: 2022-10-05

## 2022-10-05 NOTE — ASSESSMENT & PLAN NOTE
Clinical course and exam most consistent with pityriasis rosea  Not responsive to PO steroid  Explained the course of the condition is self-limited and may take 4-6 weeks to clear  Will start hydroxyzine prn and can use kenalog cream as needed to itchy areas up to twice a day    Pt should call if sx worsen or persist

## 2022-10-05 NOTE — PROGRESS NOTES
Name: Sarah Cruz      : 2000      MRN: [id number]  Encounter Provider: MEDARDO Medina  Encounter Date: 10/5/2022   Encounter department: [place] [address]  Pityriasis rosea  Assessment & Plan:  Clinical course and exam most consistent with pityriasis rosea  Not responsive to PO steroid  Explained the course of the condition is self-limited and may take 4-6 weeks to clear  Will start hydroxyzine prn and can use kenalog cream as needed to itchy areas up to twice a day  Pt should call if sx worsen or persist     Orders:  -     hydrOXYzine pamoate (VISTARIL) 25 mg capsule; Take 1 capsule (25 mg total) by mouth 3 (three) times a day as needed for itching  -     triamcinolone (KENALOG) 0 1 % cream; Apply topically 2 (two) times a day           Subjective      Pt is a 23 y o  y/o female who is seen today for evaluation of rash  Seen  at urgent care for papular rash on abd and B arms  Treated with medrol dose pack  The steroid did not seem to help, she feels the rash has worsened, the papules are larger and she now also has some on her back  The rash itchy, non-tender  No drainage  She denies exposure to any new meds, detergents, skin care products, foods  Review of Systems   Constitutional: Negative for chills and fever  HENT: Negative for trouble swallowing  Respiratory: Negative for shortness of breath and wheezing  Skin: Positive for rash         Current Outpatient Medications on File Prior to Visit   Medication Sig    escitalopram (LEXAPRO) 10 mg tablet Take 1 tablet (10 mg total) by mouth daily    norgestimate-ethinyl estradiol (ORTHO TRI-CYCLEN LO) 0 18/0 215/0 25 MG-25 MCG per tablet Take 1 tablet by mouth daily    Melatonin 10 MG TABS Take 1 tablet by mouth daily at bedtime (Patient not taking: No sig reported)    metFORMIN (GLUCOPHAGE) 500 mg tablet Take 500 mg by mouth 2 (two) times a day (Patient not taking: No sig reported)       Objective     /90 (BP Location: Left arm, Patient Position: Sitting, Cuff Size: Large)   Pulse 96   Temp (!) 97 4 °F (36 3 °C) (Tympanic)   Resp 16   Ht 5' 2" (1 575 m)   Wt 105 kg (230 lb 6 4 oz)   SpO2 97%   BMI 42 14 kg/m²     Physical Exam  Vitals reviewed  Constitutional:       General: She is awake  She is not in acute distress  Appearance: Normal appearance  She is well-developed and well-groomed  She is not ill-appearing or diaphoretic  Eyes:      General: Lids are normal       Conjunctiva/sclera: Conjunctivae normal    Neck:      Vascular: No carotid bruit or JVD  Cardiovascular:      Rate and Rhythm: Normal rate and regular rhythm  Pulmonary:      Effort: Pulmonary effort is normal    Skin:     General: Skin is warm and dry  Findings: Rash present  Comments: Scattered salmon colored round/oval lesions on arms, abd, back with peeling skin and paler color centrally; there is a single larger lesion on the abdomen  Neurological:      Mental Status: She is alert and oriented to person, place, and time  Psychiatric:         Attention and Perception: Attention normal          Mood and Affect: Mood normal          Speech: Speech normal          Behavior: Behavior normal  Behavior is cooperative  Thought Content:  Thought content normal          Cognition and Memory: Cognition normal          Judgment: Judgment normal        MEDARDO Greco

## 2022-10-25 DIAGNOSIS — F41.9 ANXIETY AND DEPRESSION: ICD-10-CM

## 2022-10-25 DIAGNOSIS — F32.A ANXIETY AND DEPRESSION: ICD-10-CM

## 2022-10-26 RX ORDER — ESCITALOPRAM OXALATE 10 MG/1
10 TABLET ORAL DAILY
Qty: 30 TABLET | Refills: 0 | Status: SHIPPED | OUTPATIENT
Start: 2022-10-26

## 2022-12-15 ENCOUNTER — TELEPHONE (OUTPATIENT)
Dept: OTHER | Facility: OTHER | Age: 22
End: 2022-12-15

## 2022-12-15 NOTE — TELEPHONE ENCOUNTER
Patient is calling regarding rescheduling an appointment      Date/Time:12-19-22 @ 0900    Patient was rescheduled: YES [] NO [x]    Patient requesting call back to reschedule: YES [x] NO []

## 2022-12-30 ENCOUNTER — HOSPITAL ENCOUNTER (EMERGENCY)
Facility: HOSPITAL | Age: 22
Discharge: HOME | End: 2022-12-30
Attending: EMERGENCY MEDICINE

## 2022-12-30 ENCOUNTER — APPOINTMENT (EMERGENCY)
Dept: CT IMAGING | Facility: HOSPITAL | Age: 22
End: 2022-12-30

## 2022-12-30 ENCOUNTER — APPOINTMENT (EMERGENCY)
Dept: ULTRASOUND IMAGING | Facility: HOSPITAL | Age: 22
End: 2022-12-30

## 2022-12-30 ENCOUNTER — ANESTHESIA (EMERGENCY)
Dept: PERIOP | Facility: HOSPITAL | Age: 22
End: 2022-12-30

## 2022-12-30 ENCOUNTER — ANESTHESIA EVENT (EMERGENCY)
Dept: PERIOP | Facility: HOSPITAL | Age: 22
End: 2022-12-30

## 2022-12-30 VITALS
SYSTOLIC BLOOD PRESSURE: 118 MMHG | OXYGEN SATURATION: 96 % | RESPIRATION RATE: 20 BRPM | TEMPERATURE: 98 F | HEART RATE: 92 BPM | DIASTOLIC BLOOD PRESSURE: 67 MMHG

## 2022-12-30 DIAGNOSIS — R10.11 RUQ PAIN: ICD-10-CM

## 2022-12-30 DIAGNOSIS — K80.20 CALCULUS OF GALLBLADDER WITHOUT CHOLECYSTITIS WITHOUT OBSTRUCTION: ICD-10-CM

## 2022-12-30 DIAGNOSIS — K80.10 CALCULUS OF GALLBLADDER WITH CHRONIC CHOLECYSTITIS WITHOUT OBSTRUCTION: Primary | ICD-10-CM

## 2022-12-30 PROBLEM — K21.9 GASTROESOPHAGEAL REFLUX DISEASE: Status: ACTIVE | Noted: 2022-12-30

## 2022-12-30 PROBLEM — K44.9 HIATAL HERNIA: Status: ACTIVE | Noted: 2022-12-30

## 2022-12-30 LAB
ALBUMIN SERPL BCP-MCNC: 3.7 G/DL (ref 3.5–5)
ALP SERPL-CCNC: 64 U/L (ref 34–104)
ALT SERPL W P-5'-P-CCNC: 10 U/L (ref 7–52)
ANION GAP SERPL CALCULATED.3IONS-SCNC: 9 MMOL/L (ref 4–13)
AST SERPL W P-5'-P-CCNC: 12 U/L (ref 13–39)
BACTERIA UR QL AUTO: NORMAL /HPF
BASOPHILS # BLD AUTO: 0.03 THOUSANDS/ÂΜL (ref 0–0.1)
BASOPHILS NFR BLD AUTO: 0 % (ref 0–1)
BILIRUB SERPL-MCNC: 0.28 MG/DL (ref 0.2–1)
BILIRUB UR QL STRIP: NEGATIVE
BUN SERPL-MCNC: 10 MG/DL (ref 5–25)
CALCIUM SERPL-MCNC: 8.8 MG/DL (ref 8.4–10.2)
CHLORIDE SERPL-SCNC: 105 MMOL/L (ref 96–108)
CLARITY UR: CLEAR
CO2 SERPL-SCNC: 23 MMOL/L (ref 21–32)
COLOR UR: ABNORMAL
CREAT SERPL-MCNC: 0.61 MG/DL (ref 0.6–1.3)
EOSINOPHIL # BLD AUTO: 0.08 THOUSAND/ÂΜL (ref 0–0.61)
EOSINOPHIL NFR BLD AUTO: 1 % (ref 0–6)
ERYTHROCYTE [DISTWIDTH] IN BLOOD BY AUTOMATED COUNT: 13.3 % (ref 11.6–15.1)
EXT PREGNANCY TEST URINE: NEGATIVE
EXT. CONTROL: NORMAL
GFR SERPL CREATININE-BSD FRML MDRD: 127 ML/MIN/1.73SQ M
GLUCOSE SERPL-MCNC: 112 MG/DL (ref 65–140)
GLUCOSE UR STRIP-MCNC: NEGATIVE MG/DL
HCT VFR BLD AUTO: 39 % (ref 34.8–46.1)
HGB BLD-MCNC: 12.6 G/DL (ref 11.5–15.4)
HGB UR QL STRIP.AUTO: NEGATIVE
IMM GRANULOCYTES # BLD AUTO: 0.04 THOUSAND/UL (ref 0–0.2)
IMM GRANULOCYTES NFR BLD AUTO: 1 % (ref 0–2)
KETONES UR STRIP-MCNC: NEGATIVE MG/DL
LEUKOCYTE ESTERASE UR QL STRIP: NEGATIVE
LIPASE SERPL-CCNC: 12 U/L (ref 11–82)
LYMPHOCYTES # BLD AUTO: 2.9 THOUSANDS/ÂΜL (ref 0.6–4.47)
LYMPHOCYTES NFR BLD AUTO: 33 % (ref 14–44)
MCH RBC QN AUTO: 28.5 PG (ref 26.8–34.3)
MCHC RBC AUTO-ENTMCNC: 32.3 G/DL (ref 31.4–37.4)
MCV RBC AUTO: 88 FL (ref 82–98)
MONOCYTES # BLD AUTO: 0.4 THOUSAND/ÂΜL (ref 0.17–1.22)
MONOCYTES NFR BLD AUTO: 5 % (ref 4–12)
NEUTROPHILS # BLD AUTO: 5.27 THOUSANDS/ÂΜL (ref 1.85–7.62)
NEUTS SEG NFR BLD AUTO: 60 % (ref 43–75)
NITRITE UR QL STRIP: NEGATIVE
NON-SQ EPI CELLS URNS QL MICRO: NORMAL /HPF
NRBC BLD AUTO-RTO: 0 /100 WBCS
PH UR STRIP.AUTO: 6 [PH]
PLATELET # BLD AUTO: 333 THOUSANDS/UL (ref 149–390)
PMV BLD AUTO: 9.3 FL (ref 8.9–12.7)
POTASSIUM SERPL-SCNC: 4 MMOL/L (ref 3.5–5.3)
PROT SERPL-MCNC: 6.9 G/DL (ref 6.4–8.4)
PROT UR STRIP-MCNC: ABNORMAL MG/DL
RBC # BLD AUTO: 4.42 MILLION/UL (ref 3.81–5.12)
RBC #/AREA URNS AUTO: NORMAL /HPF
SODIUM SERPL-SCNC: 137 MMOL/L (ref 135–147)
SP GR UR STRIP.AUTO: 1.02 (ref 1–1.03)
UROBILINOGEN UR STRIP-ACNC: <2 MG/DL
WBC # BLD AUTO: 8.72 THOUSAND/UL (ref 4.31–10.16)
WBC #/AREA URNS AUTO: NORMAL /HPF

## 2022-12-30 RX ORDER — HYDROMORPHONE HCL/PF 1 MG/ML
0.5 SYRINGE (ML) INJECTION
Status: DISCONTINUED | OUTPATIENT
Start: 2022-12-30 | End: 2022-12-30 | Stop reason: HOSPADM

## 2022-12-30 RX ORDER — ONDANSETRON 2 MG/ML
4 INJECTION INTRAMUSCULAR; INTRAVENOUS EVERY 4 HOURS PRN
Status: DISCONTINUED | OUTPATIENT
Start: 2022-12-30 | End: 2022-12-30 | Stop reason: HOSPADM

## 2022-12-30 RX ORDER — BUPIVACAINE HYDROCHLORIDE AND EPINEPHRINE 2.5; 5 MG/ML; UG/ML
INJECTION, SOLUTION EPIDURAL; INFILTRATION; INTRACAUDAL; PERINEURAL AS NEEDED
Status: DISCONTINUED | OUTPATIENT
Start: 2022-12-30 | End: 2022-12-30 | Stop reason: HOSPADM

## 2022-12-30 RX ORDER — OXYCODONE HYDROCHLORIDE 5 MG/1
5 TABLET ORAL EVERY 4 HOURS PRN
Qty: 10 TABLET | Refills: 0 | Status: SHIPPED | OUTPATIENT
Start: 2022-12-30 | End: 2023-01-09

## 2022-12-30 RX ORDER — ACETAMINOPHEN 325 MG/1
975 TABLET ORAL ONCE
Status: COMPLETED | OUTPATIENT
Start: 2022-12-30 | End: 2022-12-30

## 2022-12-30 RX ORDER — HYDROMORPHONE HCL IN WATER/PF 6 MG/30 ML
0.2 PATIENT CONTROLLED ANALGESIA SYRINGE INTRAVENOUS
Status: DISCONTINUED | OUTPATIENT
Start: 2022-12-30 | End: 2022-12-30 | Stop reason: HOSPADM

## 2022-12-30 RX ORDER — ROCURONIUM BROMIDE 10 MG/ML
INJECTION, SOLUTION INTRAVENOUS AS NEEDED
Status: DISCONTINUED | OUTPATIENT
Start: 2022-12-30 | End: 2022-12-30

## 2022-12-30 RX ORDER — MAGNESIUM HYDROXIDE 1200 MG/15ML
LIQUID ORAL AS NEEDED
Status: DISCONTINUED | OUTPATIENT
Start: 2022-12-30 | End: 2022-12-30 | Stop reason: HOSPADM

## 2022-12-30 RX ORDER — GLYCOPYRROLATE 0.2 MG/ML
INJECTION INTRAMUSCULAR; INTRAVENOUS AS NEEDED
Status: DISCONTINUED | OUTPATIENT
Start: 2022-12-30 | End: 2022-12-30

## 2022-12-30 RX ORDER — DEXAMETHASONE SODIUM PHOSPHATE 10 MG/ML
INJECTION, SOLUTION INTRAMUSCULAR; INTRAVENOUS AS NEEDED
Status: DISCONTINUED | OUTPATIENT
Start: 2022-12-30 | End: 2022-12-30

## 2022-12-30 RX ORDER — SODIUM CHLORIDE, SODIUM LACTATE, POTASSIUM CHLORIDE, CALCIUM CHLORIDE 600; 310; 30; 20 MG/100ML; MG/100ML; MG/100ML; MG/100ML
INJECTION, SOLUTION INTRAVENOUS CONTINUOUS PRN
Status: DISCONTINUED | OUTPATIENT
Start: 2022-12-30 | End: 2022-12-30

## 2022-12-30 RX ORDER — OXYCODONE HYDROCHLORIDE 5 MG/1
5 TABLET ORAL EVERY 4 HOURS PRN
Status: DISCONTINUED | OUTPATIENT
Start: 2022-12-30 | End: 2022-12-30 | Stop reason: HOSPADM

## 2022-12-30 RX ORDER — CEFAZOLIN SODIUM 2 G/50ML
2000 SOLUTION INTRAVENOUS
Status: COMPLETED | OUTPATIENT
Start: 2022-12-30 | End: 2022-12-30

## 2022-12-30 RX ORDER — FENTANYL CITRATE 50 UG/ML
INJECTION, SOLUTION INTRAMUSCULAR; INTRAVENOUS AS NEEDED
Status: DISCONTINUED | OUTPATIENT
Start: 2022-12-30 | End: 2022-12-30

## 2022-12-30 RX ORDER — ONDANSETRON 2 MG/ML
4 INJECTION INTRAMUSCULAR; INTRAVENOUS ONCE AS NEEDED
Status: COMPLETED | OUTPATIENT
Start: 2022-12-30 | End: 2022-12-30

## 2022-12-30 RX ORDER — METRONIDAZOLE 500 MG/100ML
500 INJECTION, SOLUTION INTRAVENOUS ONCE
Status: COMPLETED | OUTPATIENT
Start: 2022-12-30 | End: 2022-12-30

## 2022-12-30 RX ORDER — FENTANYL CITRATE/PF 50 MCG/ML
25 SYRINGE (ML) INJECTION
Status: DISCONTINUED | OUTPATIENT
Start: 2022-12-30 | End: 2022-12-30 | Stop reason: HOSPADM

## 2022-12-30 RX ORDER — ONDANSETRON 2 MG/ML
INJECTION INTRAMUSCULAR; INTRAVENOUS AS NEEDED
Status: DISCONTINUED | OUTPATIENT
Start: 2022-12-30 | End: 2022-12-30

## 2022-12-30 RX ORDER — SODIUM CHLORIDE 9 MG/ML
100 INJECTION, SOLUTION INTRAVENOUS CONTINUOUS
Status: DISCONTINUED | OUTPATIENT
Start: 2022-12-30 | End: 2022-12-30 | Stop reason: HOSPADM

## 2022-12-30 RX ORDER — SODIUM CHLORIDE, SODIUM LACTATE, POTASSIUM CHLORIDE, CALCIUM CHLORIDE 600; 310; 30; 20 MG/100ML; MG/100ML; MG/100ML; MG/100ML
125 INJECTION, SOLUTION INTRAVENOUS CONTINUOUS
Status: DISCONTINUED | OUTPATIENT
Start: 2022-12-30 | End: 2022-12-30 | Stop reason: HOSPADM

## 2022-12-30 RX ORDER — KETOROLAC TROMETHAMINE 30 MG/ML
INJECTION, SOLUTION INTRAMUSCULAR; INTRAVENOUS AS NEEDED
Status: DISCONTINUED | OUTPATIENT
Start: 2022-12-30 | End: 2022-12-30

## 2022-12-30 RX ORDER — LIDOCAINE HYDROCHLORIDE 10 MG/ML
INJECTION, SOLUTION EPIDURAL; INFILTRATION; INTRACAUDAL; PERINEURAL AS NEEDED
Status: DISCONTINUED | OUTPATIENT
Start: 2022-12-30 | End: 2022-12-30

## 2022-12-30 RX ORDER — MIDAZOLAM HYDROCHLORIDE 2 MG/2ML
INJECTION, SOLUTION INTRAMUSCULAR; INTRAVENOUS AS NEEDED
Status: DISCONTINUED | OUTPATIENT
Start: 2022-12-30 | End: 2022-12-30

## 2022-12-30 RX ORDER — DEXMEDETOMIDINE HYDROCHLORIDE 100 UG/ML
INJECTION, SOLUTION INTRAVENOUS AS NEEDED
Status: DISCONTINUED | OUTPATIENT
Start: 2022-12-30 | End: 2022-12-30

## 2022-12-30 RX ORDER — PROPOFOL 10 MG/ML
INJECTION, EMULSION INTRAVENOUS AS NEEDED
Status: DISCONTINUED | OUTPATIENT
Start: 2022-12-30 | End: 2022-12-30

## 2022-12-30 RX ADMIN — ROCURONIUM BROMIDE 50 MG: 10 SOLUTION INTRAVENOUS at 12:52

## 2022-12-30 RX ADMIN — FENTANYL CITRATE 25 MCG: 50 INJECTION INTRAMUSCULAR; INTRAVENOUS at 14:18

## 2022-12-30 RX ADMIN — DEXMEDETOMIDINE HYDROCHLORIDE 8 MCG: 100 INJECTION, SOLUTION INTRAVENOUS at 12:49

## 2022-12-30 RX ADMIN — SODIUM CHLORIDE 100 ML/HR: 0.9 INJECTION, SOLUTION INTRAVENOUS at 10:26

## 2022-12-30 RX ADMIN — FENTANYL CITRATE 50 MCG: 50 INJECTION, SOLUTION INTRAMUSCULAR; INTRAVENOUS at 12:58

## 2022-12-30 RX ADMIN — LIDOCAINE HYDROCHLORIDE 50 MG: 10 INJECTION, SOLUTION EPIDURAL; INFILTRATION; INTRACAUDAL at 12:50

## 2022-12-30 RX ADMIN — ACETAMINOPHEN 975 MG: 325 TABLET ORAL at 08:15

## 2022-12-30 RX ADMIN — KETOROLAC TROMETHAMINE 15 MG: 30 INJECTION, SOLUTION INTRAMUSCULAR at 13:24

## 2022-12-30 RX ADMIN — ONDANSETRON 4 MG: 2 INJECTION INTRAMUSCULAR; INTRAVENOUS at 14:06

## 2022-12-30 RX ADMIN — CEFAZOLIN SODIUM 2000 MG: 2 SOLUTION INTRAVENOUS at 12:45

## 2022-12-30 RX ADMIN — METRONIDAZOLE 500 MG: 500 INJECTION, SOLUTION INTRAVENOUS at 10:26

## 2022-12-30 RX ADMIN — SUGAMMADEX 200 MG: 100 INJECTION, SOLUTION INTRAVENOUS at 13:39

## 2022-12-30 RX ADMIN — PROPOFOL 200 MG: 10 INJECTION, EMULSION INTRAVENOUS at 12:52

## 2022-12-30 RX ADMIN — DEXMEDETOMIDINE HYDROCHLORIDE 4 MCG: 100 INJECTION, SOLUTION INTRAVENOUS at 13:20

## 2022-12-30 RX ADMIN — FENTANYL CITRATE 50 MCG: 50 INJECTION, SOLUTION INTRAMUSCULAR; INTRAVENOUS at 12:52

## 2022-12-30 RX ADMIN — ONDANSETRON 4 MG: 2 INJECTION INTRAMUSCULAR; INTRAVENOUS at 13:08

## 2022-12-30 RX ADMIN — SODIUM CHLORIDE, SODIUM LACTATE, POTASSIUM CHLORIDE, AND CALCIUM CHLORIDE: .6; .31; .03; .02 INJECTION, SOLUTION INTRAVENOUS at 13:37

## 2022-12-30 RX ADMIN — MIDAZOLAM HYDROCHLORIDE 2 MG: 1 INJECTION, SOLUTION INTRAMUSCULAR; INTRAVENOUS at 12:45

## 2022-12-30 RX ADMIN — DEXMEDETOMIDINE HYDROCHLORIDE 8 MCG: 100 INJECTION, SOLUTION INTRAVENOUS at 13:00

## 2022-12-30 RX ADMIN — OXYCODONE HYDROCHLORIDE 5 MG: 5 TABLET ORAL at 14:54

## 2022-12-30 RX ADMIN — FENTANYL CITRATE 50 MCG: 50 INJECTION, SOLUTION INTRAMUSCULAR; INTRAVENOUS at 13:50

## 2022-12-30 RX ADMIN — FENTANYL CITRATE 25 MCG: 50 INJECTION INTRAMUSCULAR; INTRAVENOUS at 14:08

## 2022-12-30 RX ADMIN — IOHEXOL 100 ML: 350 INJECTION, SOLUTION INTRAVENOUS at 08:32

## 2022-12-30 RX ADMIN — GLYCOPYRROLATE 0.1 MG: 0.2 INJECTION, SOLUTION INTRAMUSCULAR; INTRAVENOUS at 13:01

## 2022-12-30 RX ADMIN — DEXAMETHASONE SODIUM PHOSPHATE 10 MG: 10 INJECTION, SOLUTION INTRAMUSCULAR; INTRAVENOUS at 13:00

## 2022-12-30 NOTE — ED ATTENDING ATTESTATION
12/30/2022  ITrini, DO, saw and evaluated the patient  I have discussed the patient with the resident/non-physician practitioner and agree with the resident's/non-physician practitioner's findings, Plan of Care, and MDM as documented in the resident's/non-physician practitioner's note, except where noted  All available labs and Radiology studies were reviewed  I was present for key portions of any procedure(s) performed by the resident/non-physician practitioner and I was immediately available to provide assistance  At this point I agree with the current assessment done in the Emergency Department  I have conducted an independent evaluation of this patient a history and physical is as follows:    ED Course   23year old female presents with RUQ pain x several hours  Pain has been waxing and waning for "months", worse this am   Patient has been having diarrhea x months, non-bloody  Pain correlates with food intake and worsens with eating  Eating also triggers loose stools  Pt  Also reports body aches and joint for > 1 month  Patient also reports having chronic right lower quadrant pain which she feels that has gradually worsened over time  She has a history of PCOS and feels like she might have a cyst in the right lower quadrant  Wt Readings from Last 3 Encounters:   10/05/22 105 kg (230 lb 6 4 oz)   03/24/22 99 2 kg (218 lb 9 6 oz)   05/10/21 91 3 kg (201 lb 3 2 oz)     PmhX: PCOS    PE:  VSS, patient is anxious and tearful  Mucous membranes are moist   Neck is supple  Heart is regular rate and rhythm without ectopy  Clear to auscultation bilateral   Abdomen is soft, tender in the right upper quadrant  Tenderness in the right lower quadrant  No rebound or guarding,  No cva tenderness    Plan: 38-year-old female presents with recurrent right upper quadrant pain  Ultrasound demonstrates cholelithiasis  Will consult surgery to valuate for possible treatment of symptomatic cholelithiasis  Patient without signs of acute cholecystitis      Critical Care Time  Procedures

## 2022-12-30 NOTE — ANESTHESIA PREPROCEDURE EVALUATION
Procedure:  CHOLECYSTECTOMY LAPAROSCOPIC (Abdomen)    Relevant Problems   GI/HEPATIC   (+) Gastroesophageal reflux disease   (+) Hiatal hernia      NEURO/PSYCH   (+) Anxiety and depression (no meds currently)      Endocrine   (+) PCOS (polycystic ovarian syndrome)      Other   (+) Obesity        Physical Exam    Airway    Mallampati score: II  TM Distance: >3 FB  Neck ROM: full     Dental       Cardiovascular  Rhythm: regular, Rate: normal,     Pulmonary  Breath sounds clear to auscultation,     Other Findings        Anesthesia Plan  ASA Score- 3     Anesthesia Type- general with ASA Monitors  Additional Monitors:   Airway Plan: ETT  Plan Factors-    Chart reviewed  Patient is not a current smoker  Induction- intravenous  Postoperative Plan- Plan for postoperative opioid use  Informed Consent- Anesthetic plan and risks discussed with patient  I personally reviewed this patient with the CRNA  Discussed and agreed on the Anesthesia Plan with the CRNA  Bronson Talbot

## 2022-12-30 NOTE — H&P
Consultation - General Surgery   Alvaro Bryson 23 y o  female MRN: [id number]  Unit/Bed#: ED-02 Encounter: [id number]    ASSESSMENT:  22 y/o female with no sig PMH other than PCOS who presents with 6 months of post-prandial RUQ, which has worsened in severity and became more constant over the last 24 hrs  Biliary colic  Cholelithiasis   AF, hypertensive/tachycardiac on admission likely 2/2 pain/anxiety, vitals improved after analgesia  No leukocytosis, WBC 8 72  LFTs WNL  UA/BHCG negative   CTAP: incidental finding of hiatal hernia, no gallstones, normal GB wall thickness, no inflammatory changes, no biliary duct dilation  RUQ US: Cholelithasis, no sludge, no signs of acute cholecystitis    PLAN:   Add on laparoscopic cholecystectomy for treatment of her acute on chronic worsening biliary colic  Plan for d/c same day  Case request made  Written/verbal consent obtain  All questions answered  NPO  IVF  Analgesia/antiemetics prn  Pre-op abx ppx  DVT ppx: SCDs/ambulation    ----------------------------------------------------------------------------------------------------------------------  SUBJECTIVE:    Chief Complaint: RUQ pain, nausea, diarrhea    HPI:  Alvaro Bryson is a 23 y o  female with PMH PCOS who presents with worsening RUQ pain  Described as dull aching RUQ post-prandial pain for the last 6 months  Over the last 24 hrs pain became more severe/constant  Worsen with laying down or palpation  Associated nausea, no vomiting  + yellow diarrhea (has been occurring with the pain, now after every meal, no specific meals are a precipitating factor) Currently abdominal pain still constant, mild relief with PO Tylenol, no nausea       PMH: PCOS  PSH: tonsilectomy/adenoidectomy, no abdominal surg  Meds: no medications, weaned of Lexapro recently  Allergies: NKA  FHx: + GB dz grandmother  SHx: 1x month social ETOH use, no tobacco/drug use    ROS:  Review of Systems   Constitutional: Negative for activity change, appetite change, chills, fever and unexpected weight change  HENT: Negative  Eyes: Negative  Respiratory: Negative  Cardiovascular: Negative  Gastrointestinal: Positive for abdominal pain, diarrhea and nausea  Negative for abdominal distention, blood in stool, constipation and vomiting  Acid reflux    Genitourinary: Negative for difficulty urinating, dysuria and hematuria  Musculoskeletal: Negative  Skin: Negative  Neurological: Negative for dizziness, weakness, light-headedness and headaches  Psychiatric/Behavioral: Negative  Historical Information   Past Medical History:   Diagnosis Date   • Headache(784 0)    • Obesity    • PCOS (polycystic ovarian syndrome)      Past Surgical History:   Procedure Laterality Date   • TONSILLECTOMY AND ADENOIDECTOMY       Social History   Social History     Substance and Sexual Activity   Alcohol Use Never     Social History     Substance and Sexual Activity   Drug Use Never     Social History     Tobacco Use   Smoking Status Never   Smokeless Tobacco Never       Family History:   Family History   Problem Relation Age of Onset   • Endometriosis Mother    • No Known Problems Father    • No Known Problems Sister    • No Known Problems Brother    • No Known Problems Maternal Grandmother    • Lung cancer Maternal Grandfather    • No Known Problems Paternal Grandmother    • No Known Problems Paternal Grandfather    • No Known Problems Sister        Meds/Allergies   all medications and allergies reviewed  No Known Allergies      OBJECTIVE:  First Vitals:   Blood Pressure: (!) 160/108 (12/30/22 0709)  Pulse: (!) 111 (12/30/22 0709)  Temperature: 98 3 °F (36 8 °C) (12/30/22 0714)  Temp Source: Oral (12/30/22 0714)  Respirations: 22 (pt crying) (12/30/22 0709)  SpO2: 99 % (12/30/22 0709) There is no height or weight on file to calculate BMI    Current Vitals:   Blood Pressure: 120/60 (12/30/22 0915)  Pulse: 68 (12/30/22 0915)  Temperature: 98 3 °F (36 8 °C) (12/30/22 0714)  Temp Source: Oral (12/30/22 0714)  Respirations: 18 (12/30/22 0915)  SpO2: 99 % (12/30/22 0915)   I/Os:  No intake or output data in the 24 hours ending 12/30/22 1023  Lines/Drains:  Invasive Devices     Peripheral Intravenous Line  Duration           Peripheral IV 12/30/22 Right Antecubital <1 day                Physical Exam  Constitutional:       General: She is not in acute distress  Appearance: She is not toxic-appearing  HENT:      Head: Normocephalic and atraumatic  Cardiovascular:      Rate and Rhythm: Normal rate and regular rhythm  Pulses:           Dorsalis pedis pulses are 2+ on the right side and 2+ on the left side  Heart sounds: No murmur heard  Pulmonary:      Effort: Pulmonary effort is normal  No respiratory distress  Breath sounds: No wheezing, rhonchi or rales  Abdominal:      General: Bowel sounds are normal  There is no distension  Palpations: Abdomen is soft  Tenderness: There is abdominal tenderness (RUQ)  There is no guarding or rebound  Comments: Abdominal striae, no surgical scars  - Vieira's sign   Musculoskeletal:      Right lower leg: No edema  Left lower leg: No edema  Skin:     General: Skin is warm and dry  Neurological:      General: No focal deficit present  Mental Status: She is alert  Lab Results: I have personally reviewed pertinent lab results      Recent Results (from the past 36 hour(s))   CBC and differential    Collection Time: 12/30/22  7:33 AM   Result Value Ref Range    WBC 8 72 4 31 - 10 16 Thousand/uL    RBC 4 42 3 81 - 5 12 Million/uL    Hemoglobin 12 6 11 5 - 15 4 g/dL    Hematocrit 39 0 34 8 - 46 1 %    MCV 88 82 - 98 fL    MCH 28 5 26 8 - 34 3 pg    MCHC 32 3 31 4 - 37 4 g/dL    RDW 13 3 11 6 - 15 1 %    MPV 9 3 8 9 - 12 7 fL    Platelets 056 687 - 078 Thousands/uL    nRBC 0 /100 WBCs    Neutrophils Relative 60 43 - 75 %    Immat GRANS % 1 0 - 2 % Lymphocytes Relative 33 14 - 44 %    Monocytes Relative 5 4 - 12 %    Eosinophils Relative 1 0 - 6 %    Basophils Relative 0 0 - 1 %    Neutrophils Absolute 5 27 1 85 - 7 62 Thousands/µL    Immature Grans Absolute 0 04 0 00 - 0 20 Thousand/uL    Lymphocytes Absolute 2 90 0 60 - 4 47 Thousands/µL    Monocytes Absolute 0 40 0 17 - 1 22 Thousand/µL    Eosinophils Absolute 0 08 0 00 - 0 61 Thousand/µL    Basophils Absolute 0 03 0 00 - 0 10 Thousands/µL   Comprehensive metabolic panel    Collection Time: 12/30/22  7:33 AM   Result Value Ref Range    Sodium 137 135 - 147 mmol/L    Potassium 4 0 3 5 - 5 3 mmol/L    Chloride 105 96 - 108 mmol/L    CO2 23 21 - 32 mmol/L    ANION GAP 9 4 - 13 mmol/L    BUN 10 5 - 25 mg/dL    Creatinine 0 61 0 60 - 1 30 mg/dL    Glucose 112 65 - 140 mg/dL    Calcium 8 8 8 4 - 10 2 mg/dL    AST 12 (L) 13 - 39 U/L    ALT 10 7 - 52 U/L    Alkaline Phosphatase 64 34 - 104 U/L    Total Protein 6 9 6 4 - 8 4 g/dL    Albumin 3 7 3 5 - 5 0 g/dL    Total Bilirubin 0 28 0 20 - 1 00 mg/dL    eGFR 127 ml/min/1 73sq m   Lipase    Collection Time: 12/30/22  7:33 AM   Result Value Ref Range    Lipase 12 11 - 82 u/L   UA w Reflex to Microscopic w Reflex to Culture    Collection Time: 12/30/22  8:19 AM    Specimen: Urine, Clean Catch   Result Value Ref Range    Color, UA Light Yellow     Clarity, UA Clear     Specific Gravity, UA 1 022 1 003 - 1 030    pH, UA 6 0 4 5, 5 0, 5 5, 6 0, 6 5, 7 0, 7 5, 8 0    Leukocytes, UA Negative Negative    Nitrite, UA Negative Negative    Protein, UA Trace (A) Negative mg/dl    Glucose, UA Negative Negative mg/dl    Ketones, UA Negative Negative mg/dl    Urobilinogen, UA <2 0 <2 0 mg/dl mg/dl    Bilirubin, UA Negative Negative    Occult Blood, UA Negative Negative   Urine Microscopic    Collection Time: 12/30/22  8:19 AM   Result Value Ref Range    RBC, UA 1-2 None Seen, 1-2 /hpf    WBC, UA 1-2 None Seen, 1-2 /hpf    Epithelial Cells Occasional None Seen, Occasional /hpf Bacteria, UA None Seen None Seen, Occasional /hpf   POCT pregnancy, urine    Collection Time: 12/30/22  8:22 AM   Result Value Ref Range    EXT Preg Test, Ur Negative     Control Valid      Imaging: I have personally reviewed pertinent reports  US right upper quadrant    Result Date: 12/30/2022  Impression: Cholelithiasis without findings of acute cholecystitis  No intra- or extrahepatic biliary ductal dilatation  Workstation performed: [id number]     CT abdomen pelvis with contrast    Result Date: 12/30/2022  Impression: 1  No evidence of acute cholecystitis  The gallstones seen on prior ultrasound are isodense to bile  2   No acute findings in the abdomen or pelvis  Workstation performed: [id number]     EKG, Pathology, and Other Studies: I have personally reviewed pertinent reports          IAN Schafer  12/30/2022

## 2022-12-30 NOTE — OP NOTE
OPERATIVE REPORT  PATIENT NAME: Alease Jeans    :  2000  MRN: [id number]  Pt Location: [facility] OR ROOM 05    SURGERY DATE: 2022    Surgeon(s) and Role:     * Amador Wilhelm,  - Primary     * Keesha Mora PA-C - Assisting  No qualified resident was available  Her assistance was required for exposure and retraction throughout the case    Preop Diagnosis:  Calculus of gallbladder without cholecystitis without obstruction [K80 20]    Post-Op Diagnosis Codes:     * Calculus of gallbladder without cholecystitis without obstruction [K80 20]    Procedure(s) (LRB):  CHOLECYSTECTOMY LAPAROSCOPIC (N/A)    Specimen(s):  ID Type Source Tests Collected by Time Destination   1 :  Tissue Gallbladder TISSUE EXAM Jaylen Stacy DO 2022 1308        Estimated Blood Loss:   Minimal    Drains:  NG/OG/Enteral Tube Orogastric 18 Fr Center mouth (Active)   Number of days: 0       Anesthesia Type:   General/local    Operative Indications:  Calculus of gallbladder without cholecystitis without obstruction [K80 20]      Operative Findings:  Distended gallbladder  Omentum adhesions up to the undersurface of the gallbladder indicative of prior as well as acute inflammation  Height 62 inches weight 105 kg/2 and 30 pounds  BMI 42  ASA 2  Wound class II    Complications:   None    Procedure and Technique:  Patient was brought the operative suite and identified by visualization, conversation, by armband  Sequential compression pumps were placed  She was given Ancef perioperatively  Once under anesthesia abdomen was then prepped and draped in a sterile fashion  Timeout was performed and is assured that the prep was dry  Local was instilled at the infraumbilical fold  Small vertical skin incision was made and the underlying subcutaneous tissue was bluntly dissected with Felton clamps down to the fascia  Fascia was lifted up and divided in the midline    Poked through the underlying peritoneum gaining access into the abdominal cavity  An 11 mm trocar was placed under direct visualization  2 was attached crating a pneumoperitoneum  3 other 5 this trochars were then placed in the right upper quadrant  Gallbladder is lifted cephalad  She had omental adhesions to the entire undersurface of the gallbladder  These were taken down with a combination of blunt dissection use of the harmonic scalpel  Once the neck of the gallbladder was noted this was retracted laterally and anteriorly  Careful dissection was carried out to identify both the cystic duct and cystic artery structures  We then clearly going into the gallbladder and they were divided between ligaclips  The gallbladder was then taken off the liver using variable speed the harmonic scalpel  There was excellent hemostasis  Gallbladder is placed into an Endo Catch bag  Irrigation was carried out  Gallbladder is then brought to the umbilical port site after pneumoperitoneum was evacuated  Other trochars were removed  Fascia at the umbilical port site was closing 0 Vicryl in a figure-of-eight fashion x2  Local was instilled  4-0 Monocryl was used to close all skin incisions in a subcuticular fashion  Wounds were washed and dried  Sterile skin glue was applied  She was awakened in the operating room and returned to the recovery area in stable condition having tolerated the procedure well     I was present for the entire procedure    Patient Disposition:  PACU         SIGNATURE: Sheryle Cons, DO  DATE: December 30, 2022  TIME: 1:27 PM

## 2022-12-30 NOTE — ANESTHESIA POSTPROCEDURE EVALUATION
Post-Op Assessment Note    CV Status:  Stable  Pain Score: 5    Pain management: adequate     Mental Status:  Alert and awake   Hydration Status:  Euvolemic   PONV Controlled:  Controlled   Airway Patency:  Patent   Two or more mitigation strategies used for obstructive sleep apnea   Post Op Vitals Reviewed: Yes      Staff: CRNA, Anesthesiologist         No notable events documented      BP   99/54   Temp   97 8   Pulse  100   Resp   19   SpO2   97

## 2022-12-30 NOTE — ED PROVIDER NOTES
History  Chief Complaint   Patient presents with   • Abdominal Pain     RUQ x 6 months but has worsen this morning   + n/d  Rates pain 3-4 /10     Patient is a 17-year-old female with PMH of PCOS who presents to the emergency department with 2 months of right upper quadrant abdominal pain, generally 1/10 but reports that over the last 48 hours her pain is becoming 4/10  She reports that her pain is worse after fatty meals but also reports that there is associated watery diarrhea that has been present for the last several months as well  She reports going 1-2 times daily, and often after eating  She denies any blood in her stool, fevers, vomiting, or shortness of breath but does report intermittent nausea  She states that she is also had joint aches and some body aches, intermittently over the last month as well  She states that with her history of PCOS she often has intermittent right or left-sided pelvic pain and is currently experiencing mild, right pelvic pain which she feels is likely associated with an ovarian cyst as this has occurred many times in her past   Patient is otherwise healthy and denies any recent illnesses or injuries  She denies any history of abdominal surgeries  She takes oral contraceptive pills  Prior to Admission Medications   Prescriptions Last Dose Informant Patient Reported? Taking?    Melatonin 10 MG TABS   Yes No   Sig: Take 1 tablet by mouth daily at bedtime   Patient not taking: No sig reported   escitalopram (LEXAPRO) 10 mg tablet Past Week  No Yes   Sig: Take 1 tablet (10 mg total) by mouth daily   hydrOXYzine pamoate (VISTARIL) 25 mg capsule Unknown  No No   Sig: Take 1 capsule (25 mg total) by mouth 3 (three) times a day as needed for itching   metFORMIN (GLUCOPHAGE) 500 mg tablet   Yes No   Sig: Take 500 mg by mouth 2 (two) times a day   Patient not taking: No sig reported   norgestimate-ethinyl estradiol (ORTHO TRI-CYCLEN LO) 0 18/0 215/0 25 MG-25 MCG per tablet Yes No   Sig: Take 1 tablet by mouth daily   triamcinolone (KENALOG) 0 1 % cream Unknown  No No   Sig: Apply topically 2 (two) times a day      Facility-Administered Medications: None       Past Medical History:   Diagnosis Date   • Headache(784 0)    • Obesity    • PCOS (polycystic ovarian syndrome)        Past Surgical History:   Procedure Laterality Date   • TONSILLECTOMY AND ADENOIDECTOMY         Family History   Problem Relation Age of Onset   • Endometriosis Mother    • No Known Problems Father    • No Known Problems Sister    • No Known Problems Brother    • No Known Problems Maternal Grandmother    • Lung cancer Maternal Grandfather    • No Known Problems Paternal Grandmother    • No Known Problems Paternal Grandfather    • No Known Problems Sister      I have reviewed and agree with the history as documented  E-Cigarette/Vaping   • E-Cigarette Use Never User      E-Cigarette/Vaping Substances   • Nicotine No    • THC No    • CBD No    • Flavoring No    • Other No    • Unknown No      Social History     Tobacco Use   • Smoking status: Never   • Smokeless tobacco: Never   Vaping Use   • Vaping Use: Never used   Substance Use Topics   • Alcohol use: Never   • Drug use: Never        Review of Systems   Constitutional: Negative for chills and fever  Respiratory: Negative for cough and shortness of breath  Cardiovascular: Negative for chest pain and palpitations  Gastrointestinal: Positive for abdominal pain, diarrhea and nausea (Intermittent)  Negative for blood in stool, constipation and vomiting  Genitourinary: Negative for dysuria, flank pain, frequency, hematuria and urgency  Musculoskeletal: Positive for arthralgias (Mild) and myalgias (Mild)  Negative for back pain  Skin: Negative for color change and rash  Neurological: Negative for dizziness, seizures, syncope, light-headedness and headaches  All other systems reviewed and are negative        Physical Exam  ED Triage Vitals Temperature Pulse Respirations Blood Pressure SpO2   12/30/22 0714 12/30/22 0709 12/30/22 0709 12/30/22 0709 12/30/22 0709   98 3 °F (36 8 °C) (!) 111 22 (!) 160/108 99 %      Temp Source Heart Rate Source Patient Position - Orthostatic VS BP Location FiO2 (%)   12/30/22 0714 12/30/22 0715 12/30/22 0709 12/30/22 0709 --   Oral Monitor Lying Left arm       Pain Score       12/30/22 0922       No Pain             Orthostatic Vital Signs  Vitals:    12/30/22 1430 12/30/22 1444 12/30/22 1500 12/30/22 1535   BP: 113/62 109/67 115/68 118/67   Pulse: 96  91 92   Patient Position - Orthostatic VS:           Physical Exam  Vitals and nursing note reviewed  Constitutional:       General: She is not in acute distress  Appearance: Normal appearance  She is well-developed  She is not ill-appearing or toxic-appearing  HENT:      Head: Normocephalic and atraumatic  Right Ear: External ear normal       Left Ear: External ear normal       Mouth/Throat:      Pharynx: No posterior oropharyngeal erythema  Eyes:      General:         Right eye: No discharge  Left eye: No discharge  Extraocular Movements: Extraocular movements intact  Conjunctiva/sclera: Conjunctivae normal    Cardiovascular:      Rate and Rhythm: Normal rate and regular rhythm  Pulses: Normal pulses  Heart sounds: Normal heart sounds  No murmur heard  Pulmonary:      Effort: Pulmonary effort is normal  No respiratory distress  Breath sounds: Normal breath sounds  No wheezing, rhonchi or rales  Abdominal:      General: Abdomen is flat  Palpations: Abdomen is soft  Tenderness: There is abdominal tenderness in the right upper quadrant and right lower quadrant  There is no right CVA tenderness, left CVA tenderness, guarding or rebound  Negative signs include Vieira's sign and McBurney's sign  Musculoskeletal:         General: No swelling or deformity  Normal range of motion  Cervical back: Neck supple   No tenderness  Skin:     General: Skin is warm and dry  Capillary Refill: Capillary refill takes less than 2 seconds  Neurological:      Mental Status: She is alert           ED Medications  Medications   sodium chloride 0 9 % infusion ( Intravenous Stopped 12/30/22 1337)   HYDROmorphone (DILAUDID) injection 0 5 mg (has no administration in time range)   lactated ringers infusion (has no administration in time range)   ondansetron (ZOFRAN) injection 4 mg (has no administration in time range)   oxyCODONE (ROXICODONE) IR tablet 5 mg (5 mg Oral Given 12/30/22 1454)   acetaminophen (TYLENOL) tablet 975 mg (975 mg Oral Given 12/30/22 0815)   iohexol (OMNIPAQUE) 350 MG/ML injection (SINGLE-DOSE) 100 mL (100 mL Intravenous Given 12/30/22 0832)   metroNIDAZOLE (FLAGYL) IVPB (premix) 500 mg 100 mL (500 mg Intravenous New Bag 12/30/22 1026)   ceFAZolin (ANCEF) IVPB (premix in dextrose) 2,000 mg 50 mL ( Intravenous MAR Unhold 12/30/22 1300)   ondansetron (ZOFRAN) injection 4 mg (4 mg Intravenous Given 12/30/22 1406)       Diagnostic Studies  Results Reviewed     Procedure Component Value Units Date/Time    Urine Microscopic [[id number]]  (Normal) Collected: 12/30/22 0819    Lab Status: Final result Specimen: Urine, Clean Catch Updated: 12/30/22 0837     RBC, UA 1-2 /hpf      WBC, UA 1-2 /hpf      Epithelial Cells Occasional /hpf      Bacteria, UA None Seen /hpf     UA w Reflex to Microscopic w Reflex to Culture [[id number]]  (Abnormal) Collected: 12/30/22 0819    Lab Status: Final result Specimen: Urine, Clean Catch Updated: 12/30/22 0835     Color, UA Light Yellow     Clarity, UA Clear     Specific Gravity, UA 1 022     pH, UA 6 0     Leukocytes, UA Negative     Nitrite, UA Negative     Protein, UA Trace mg/dl      Glucose, UA Negative mg/dl      Ketones, UA Negative mg/dl      Urobilinogen, UA <2 0 mg/dl      Bilirubin, UA Negative     Occult Blood, UA Negative    POCT pregnancy, urine [[id number]]  (Normal) Resulted: 12/30/22 0822    Lab Status: Final result Specimen: Urine Updated: 12/30/22 0822     EXT Preg Test, Ur Negative     Control Valid    Lipase [[id number]]  (Normal) Collected: 12/30/22 0733    Lab Status: Final result Specimen: Blood from Arm, Right Updated: 12/30/22 0804     Lipase 12 u/L     Comprehensive metabolic panel [[id number]]  (Abnormal) Collected: 12/30/22 0733    Lab Status: Final result Specimen: Blood from Arm, Right Updated: 12/30/22 0804     Sodium 137 mmol/L      Potassium 4 0 mmol/L      Chloride 105 mmol/L      CO2 23 mmol/L      ANION GAP 9 mmol/L      BUN 10 mg/dL      Creatinine 0 61 mg/dL      Glucose 112 mg/dL      Calcium 8 8 mg/dL      AST 12 U/L      ALT 10 U/L      Alkaline Phosphatase 64 U/L      Total Protein 6 9 g/dL      Albumin 3 7 g/dL      Total Bilirubin 0 28 mg/dL      eGFR 127 ml/min/1 73sq m     Narrative:      National Kid[employer] Foundation guidelines for Chronic Kidney Disease (CKD):   •  Stage 1 with normal or high GFR (GFR > 90 mL/min/1 73 square meters)  •  Stage 2 Mild CKD (GFR = 60-89 mL/min/1 73 square meters)  •  Stage 3A Moderate CKD (GFR = 45-59 mL/min/1 73 square meters)  •  Stage 3B Moderate CKD (GFR = 30-44 mL/min/1 73 square meters)  •  Stage 4 Severe CKD (GFR = 15-29 mL/min/1 73 square meters)  •  Stage 5 End Stage CKD (GFR <15 mL/min/1 73 square meters)  Note: GFR calculation is accurate only with a steady state creatinine    CBC and differential [[id number]] Collected: 12/30/22 0733    Lab Status: Final result Specimen: Blood from Arm, Right Updated: 12/30/22 0748     WBC 8 72 Thousand/uL      RBC 4 42 Million/uL      Hemoglobin 12 6 g/dL      Hematocrit 39 0 %      MCV 88 fL      MCH 28 5 pg      MCHC 32 3 g/dL      RDW 13 3 %      MPV 9 3 fL      Platelets 629 Thousands/uL      nRBC 0 /100 WBCs      Neutrophils Relative 60 %      Immat GRANS % 1 %      Lymphocytes Relative 33 %      Monocytes Relative 5 %      Eosinophils Relative 1 %      Basophils Relative 0 %      Neutrophils Absolute 5 27 Thousands/µL      Immature Grans Absolute 0 04 Thousand/uL      Lymphocytes Absolute 2 90 Thousands/µL      Monocytes Absolute 0 40 Thousand/µL      Eosinophils Absolute 0 08 Thousand/µL      Basophils Absolute 0 03 Thousands/µL     Lyme Antibody Profile with reflex to [facility] [[id number]] Collected: 12/30/22 0733    Lab Status: In process Specimen: Blood from Arm, Right Updated: 12/30/22 0738                 CT abdomen pelvis with contrast   Final Result by Roopa Ya MD (48/86 3761)      1  No evidence of acute cholecystitis  The gallstones seen on prior ultrasound are isodense to bile  2   No acute findings in the abdomen or pelvis  Workstation performed: Timothy Houston right upper quadrant   Final Result by Roopa Ya MD (16/06 0340)      Cholelithiasis without findings of acute cholecystitis  No intra- or extrahepatic biliary ductal dilatation  Workstation performed: [id number]               Procedures  Procedures      ED Course                             SBIRT 20yo+    Flowsheet Row Most Recent Value   SBIRT (25 yo +)    In order to provide better care to our patients, we are screening all of our patients for alcohol and drug use  Would it be okay to ask you these screening questions? Yes Filed at: 12/30/2022 5520   Initial Alcohol Screen: US AUDIT-C     1  How often do you have a drink containing alcohol? 1 Filed at: 12/30/2022 0803   2  How many drinks containing alcohol do you have on a typical day you are drinking? 0 Filed at: 12/30/2022 0803   3a  Male UNDER 65: How often do you have five or more drinks on one occasion? 0 Filed at: 12/30/2022 0803   3b  FEMALE Any Age, or MALE 65+: How often do you have 4 or more drinks on one occassion? 0 Filed at: 12/30/2022 9394   Audit-C Score 1 Filed at: 12/30/2022 5597   MEGAN: How many times in the past year have you        Used an illegal drug or used a prescription medication for non-medical reasons? Never Filed at: 12/30/2022 0803                [facility]  Number of Diagnoses or Management Options  Calculus of gallbladder without cholecystitis without obstruction  RUQ pain  Diagnosis management comments: 22F with PMH of PCOS presents to the emergency department with 2-month history of right upper quadrant abdominal pain that is worsened over the last 48 hours  Pain is worse after fatty meals  On exam patient has right upper quadrant abdominal tenderness without Vieira sign  No rebound or guarding  Laboratory evaluation is within normal limits  CT scan of the abdomen reveals no acute findings in the abdomen or pelvis  Right upper quadrant ultrasound reveals cholelithiasis without findings of acute cholecystitis  The patient is likely experiencing symptomatic cholelithiasis  Patient's case is discussed with the surgical team on-call who come evaluate the patient and feel she is appropriate for cholecystectomy to be performed today  The patient is admitted under the care of the surgical team for cholecystectomy          Disposition  Final diagnoses:   Calculus of gallbladder without cholecystitis without obstruction   RUQ pain     Time reflects when diagnosis was documented in both MDM as applicable and the Disposition within this note     Time User Action Codes Description Comment    12/30/2022  9:39 AM Idania Xavier Add [K80 20] Calculus of gallbladder without cholecystitis without obstruction     12/30/2022  9:39 AM Idania Xavier Add [R10 11] RUQ pain     12/30/2022  1:09 PM Coleman MARQUEZ Modify [K80 20] Calculus of gallbladder without cholecystitis without obstruction     12/30/2022  1:32 PM Vincenzo Stacy Modify [K80 20] Calculus of gallbladder without cholecystitis without obstruction     12/30/2022  1:32 PM Vincenzo Stacy Add [K80 10] Calculus of gallbladder with chronic cholecystitis without obstruction       ED Disposition     None      Follow-up Information     Follow up With Specialties Details Why Contact Info    Kait Stacy DO General Surgery Schedule an appointment as soon as possible for a visit in 2 week(s)  [address]  [place] [address]  [phone]            Discharge Medication List as of 12/30/2022  2:31 PM      START taking these medications    Details   oxyCODONE (ROXICODONE) 5 immediate release tablet Take 1 tablet (5 mg total) by mouth every 4 (four) hours as needed for moderate pain for up to 10 days Max Daily Amount: 30 mg, Starting Fri 12/30/2022, Until Mon 1/9/2023 at 2359, Normal         CONTINUE these medications which have NOT CHANGED    Details   escitalopram (LEXAPRO) 10 mg tablet Take 1 tablet (10 mg total) by mouth daily, Starting Wed 10/26/2022, Normal      hydrOXYzine pamoate (VISTARIL) 25 mg capsule Take 1 capsule (25 mg total) by mouth 3 (three) times a day as needed for itching, Starting Wed 10/5/2022, Normal      Melatonin 10 MG TABS Take 1 tablet by mouth daily at bedtime, Historical Med      metFORMIN (GLUCOPHAGE) 500 mg tablet Take 500 mg by mouth 2 (two) times a day, Starting Wed 10/21/2020, Historical Med      norgestimate-ethinyl estradiol (ORTHO TRI-CYCLEN LO) 0 18/0 215/0 25 MG-25 MCG per tablet Take 1 tablet by mouth daily, Starting Fri 10/9/2020, Until Wed 10/5/2022, Historical Med      triamcinolone (KENALOG) 0 1 % cream Apply topically 2 (two) times a day, Starting Wed 10/5/2022, Normal           No discharge procedures on file  PDMP Review     None           ED Provider  Attending physically available and evaluated Janet Loss  I managed the patient along with the ED Attending      Electronically Signed by         Raman Plaza DO  12/30/22 5427

## 2023-01-04 LAB — B BURGDOR IGG+IGM SER-ACNC: <0.2 AI

## 2023-01-12 ENCOUNTER — OFFICE VISIT (OUTPATIENT)
Dept: SURGERY | Facility: CLINIC | Age: 23
End: 2023-01-12

## 2023-01-12 DIAGNOSIS — K80.00 ACUTE CHOLECYSTITIS DUE TO BILIARY CALCULUS: Primary | ICD-10-CM

## 2023-01-12 NOTE — PROGRESS NOTES
Assessment/Plan:    Diagnoses and all orders for this visit:    Acute cholecystitis due to biliary calculus    Status post laparoscopic cholecystectomy  Doing quite well  May resume diet activity as tolerated  Pathology: Reviewed with patient, all questions answered  Postoperative restrictions reviewed  All questions answered  ______________________________________________________  HPI: Patient presents post operatively  Laparoscopic cholecystectomy 12/30/2022   Final Diagnosis  GALLBLADDER, CHOLECYSTECTOMY:    - Cholelithiasis  - No significant inflammation or changes of cholecystis  - One reactive lymph node  - Negative for dysplasia or malignancy  ROS:  General ROS: negative for - chills, fatigue, fever or night sweats, weight loss  Respiratory ROS: no cough, shortness of breath, or wheezing  Cardiovascular ROS: no chest pain or dyspnea on exertion  Genito-Urinary ROS: no dysuria, trouble voiding, or hematuria  Musculoskeletal ROS: negative for - gait disturbance, joint pain or muscle pain  Neurological ROS: no TIA or stroke symptoms  GI ROS: see HPI  Skin ROS: no new rashes or lesions   Lymphatic ROS: no new adenopathy noted by pt  GYN ROS: see HPI, no new GYN history or bleeding noted  Psy ROS: no new mental or behavioral disturbances         Patient Active Problem List   Diagnosis   • Annual physical exam   • BMI 38 0-38 9,adult   • Need for immunization against influenza   • Exposure to COVID-19 virus   • COVID   • Anxiety and depression   • PCOS (polycystic ovarian syndrome)   • Lower abdominal pain   • Pityriasis rosea   • Obesity   • Calculus of gallbladder   • Gastroesophageal reflux disease   • Hiatal hernia       Allergies:  Patient has no known allergies        Current Outpatient Medications:   •  escitalopram (LEXAPRO) 10 mg tablet, Take 1 tablet (10 mg total) by mouth daily, Disp: 30 tablet, Rfl: 0  •  hydrOXYzine pamoate (VISTARIL) 25 mg capsule, Take 1 capsule (25 mg total) by mouth 3 (three) times a day as needed for itching, Disp: 30 capsule, Rfl: 0  •  Melatonin 10 MG TABS, Take 1 tablet by mouth daily at bedtime (Patient not taking: No sig reported), Disp: , Rfl:   •  metFORMIN (GLUCOPHAGE) 500 mg tablet, Take 500 mg by mouth 2 (two) times a day (Patient not taking: No sig reported), Disp: , Rfl:   •  norgestimate-ethinyl estradiol (ORTHO TRI-CYCLEN LO) 0 18/0 215/0 25 MG-25 MCG per tablet, Take 1 tablet by mouth daily, Disp: , Rfl:   •  triamcinolone (KENALOG) 0 1 % cream, Apply topically 2 (two) times a day, Disp: 30 g, Rfl: 0    Past Medical History:   Diagnosis Date   • Headache(784 0)    • Obesity    • PCOS (polycystic ovarian syndrome)        Past Surgical History:   Procedure Laterality Date   • CHOLECYSTECTOMY LAPAROSCOPIC N/A 12/30/2022    Procedure: CHOLECYSTECTOMY LAPAROSCOPIC;  Surgeon: Viky Solorzano DO;  Location: [facility];  Service: General   • TONSILLECTOMY AND ADENOIDECTOMY         Family History   Problem Relation Age of Onset   • Endometriosis Mother    • No Known Problems Father    • No Known Problems Sister    • No Known Problems Brother    • No Known Problems Maternal Grandmother    • Lung cancer Maternal Grandfather    • No Known Problems Paternal Grandmother    • No Known Problems Paternal Grandfather    • No Known Problems Sister         reports that she has never smoked  She has never used smokeless tobacco  She reports that she does not drink alcohol and does not use drugs  PHYSICAL EXAM    There were no vitals taken for this visit      General: normal, cooperative, no distress  Abdominal: soft, nondistended or nontender  Incision: clean, dry, and intact and healing well      Viky Solorzano DO    Date: 1/12/2023 Time: 9:12 AM

## 2023-01-25 ENCOUNTER — OFFICE VISIT (OUTPATIENT)
Dept: OBGYN CLINIC | Facility: CLINIC | Age: 23
End: 2023-01-25

## 2023-01-25 VITALS
HEIGHT: 62 IN | WEIGHT: 231 LBS | BODY MASS INDEX: 42.51 KG/M2 | SYSTOLIC BLOOD PRESSURE: 130 MMHG | DIASTOLIC BLOOD PRESSURE: 82 MMHG

## 2023-01-25 DIAGNOSIS — Z30.41 ENCOUNTER FOR SURVEILLANCE OF CONTRACEPTIVE PILLS: ICD-10-CM

## 2023-01-25 DIAGNOSIS — Z12.4 ENCOUNTER FOR SCREENING FOR MALIGNANT NEOPLASM OF CERVIX: ICD-10-CM

## 2023-01-25 DIAGNOSIS — Z01.419 WELL WOMAN EXAM WITH ROUTINE GYNECOLOGICAL EXAM: Primary | ICD-10-CM

## 2023-01-25 RX ORDER — NORGESTIMATE AND ETHINYL ESTRADIOL 7DAYSX3 LO
1 KIT ORAL DAILY
Qty: 90 TABLET | Refills: 3 | Status: SHIPPED | OUTPATIENT
Start: 2023-01-25 | End: 2023-04-25

## 2023-01-26 NOTE — PROGRESS NOTES
ASSESSMENT & PLAN:       The following were reviewed in today's visit: ASCCP guidelines for pap testing, breast self exam, STD testing, family planning choices, exercise, healthy diet and PCOS  Referral to weight management  Will c/w OCP for now  Plan to TTC later this year if weight loss goals met  All questions have been answered to her satisfaction  Patient to return to office yearly for annual exam      CC:  Annual Gynecologic Examination    HPI: Miller Estrada is a 23 y o  [place] who presents for annual gynecologic examination  She has the following concerns:  Questions regarding hx of PCOS, desire to conceive, and concerns with weight  Currently on a tri-phasic OCP without issue  Reports irregular, infrequent cycles prior to ocp use  Previous dx of PCOS  Reports hx of insulin resistance and metformin use  Most recent HbA1C was wnl  Desire to TTC later this year, but would like to optimize health first      Health Maintenance:    Recommend at least 150 minutes a week of moderate intensity activity such as brisk walking and at least 2 days a week of activities that strengthen muscles  She wears her seatbelt routinely  She does perform self breast exams          Past Medical History:   Diagnosis Date   • Depression    • Headache(784 0)    • Obesity    • PCOS (polycystic ovarian syndrome)        Past Surgical History:   Procedure Laterality Date   • CHOLECYSTECTOMY  22   • CHOLECYSTECTOMY LAPAROSCOPIC N/A 2022    Procedure: CHOLECYSTECTOMY LAPAROSCOPIC;  Surgeon: Lucia Stacy DO;  Location: [facility];  Service: General   • TONSILLECTOMY AND ADENOIDECTOMY         Past OB/Gyn History:    Menstrual History:  OB History        0    Para   0    Term   0       0    AB   0    Living   0       SAB   0    IAB   0    Ectopic   0    Multiple   0    Live Births   0           Obstetric Comments   Menarche 4             Patient's last menstrual period was 01/13/2023 (exact date)  Period Cycle (Days): 28  Period Pattern: Regular  Menstrual Flow: Moderate  Menstrual Control: Maxi pad, Panty liner, Thin pad    History of sexually transmitted infection No  Patient is not interested in STI testing today  Patient is currently sexually active  heterosexual   Birth control: combination OCPs  Last Pap  2019 : WNL  Gardisil vaccination: yes    Family History   Problem Relation Age of Onset   • Endometriosis Mother    • Colon cancer Mother    • No Known Problems Father    • No Known Problems Sister    • No Known Problems Brother    • No Known Problems Maternal Grandmother    • Lung cancer Maternal Grandfather    • No Known Problems Paternal Grandmother    • No Known Problems Paternal Grandfather    • No Known Problems Sister        Social History:  Social History     Socioeconomic History   • Marital status: /Civil Union     Spouse name: Not on file   • Number of children: Not on file   • Years of education: Not on file   • Highest education level: Not on file   Occupational History   • Not on file   Tobacco Use   • Smoking status: Never   • Smokeless tobacco: Never   Vaping Use   • Vaping Use: Never used   Substance and Sexual Activity   • Alcohol use: Never   • Drug use: Never   • Sexual activity: Yes     Partners: Male     Birth control/protection: Condom Male, OCP   Other Topics Concern   • Not on file   Social History Narrative    Lives with boyfriend    Works full time, 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    Exercise: none currently    Diet: balanced    Caffeine: none     Social Determinants of Health     Financial Resource Strain: Not on file   Food Insecurity: Not on file   Transportation Needs: Not on file   Physical Activity: Not on file   Stress: Not on file   Social Connections: Not on file   Intimate Partner Violence: Not on file   Housing Stability: Not on file     Presently lives with spouse  She feels safe at home  Patient is       Patient is currently employed [employer] (OR)    No Known Allergies    Current Outpatient Medications:   •  norgestimate-ethinyl estradiol (ORTHO TRI-CYCLEN LO) 0 18/0 215/0 25 MG-25 MCG per tablet, Take 1 tablet by mouth daily, Disp: 90 tablet, Rfl: 3  •  escitalopram (LEXAPRO) 10 mg tablet, Take 1 tablet (10 mg total) by mouth daily (Patient not taking: Reported on 1/25/2023), Disp: 30 tablet, Rfl: 0    Review of Systems:  Review of Systems   Constitutional: Negative for activity change, chills, fever and unexpected weight change  HENT: Negative for congestion, ear pain, hearing loss and sore throat  Respiratory: Negative for cough, chest tightness and shortness of breath  Cardiovascular: Negative for chest pain and leg swelling  Gastrointestinal: Negative for abdominal pain, constipation, diarrhea, nausea and vomiting  Genitourinary: Negative for difficulty urinating, dysuria, frequency, menstrual problem, pelvic pain, vaginal discharge and vaginal pain  Skin: Negative for color change and rash  Neurological: Negative for dizziness, numbness and headaches  Psychiatric/Behavioral: Negative for agitation and confusion  Physical Exam:  /82 (BP Location: Right arm, Patient Position: Sitting, Cuff Size: Standard)   Ht 5' 2" (1 575 m)   Wt 105 kg (231 lb)   LMP 01/13/2023 (Exact Date)   BMI 42 25 kg/m²    Physical Exam  Constitutional:       General: She is not in acute distress  Appearance: Normal appearance  She is obese  Genitourinary:      Vulva, bladder, rectum and urethral meatus normal       No lesions in the vagina  Right Labia: No rash, tenderness or lesions  Left Labia: No tenderness, lesions or rash  No labial fusion noted  No vaginal discharge or tenderness  No vaginal prolapse present  No vaginal atrophy present  Right Adnexa: not tender, not full and no mass present  Left Adnexa: not tender, not full and no mass present       No cervical motion tenderness or friability  Uterus is not enlarged or prolapsed  Breasts:     Breasts are soft  Right: No inverted nipple, mass, nipple discharge or skin change  Left: No inverted nipple, mass, nipple discharge or skin change  Breast exam comments: Pendulous   HENT:      Head: Normocephalic and atraumatic  Nose: Nose normal    Eyes:      Conjunctiva/sclera: Conjunctivae normal       Pupils: Pupils are equal, round, and reactive to light  Cardiovascular:      Rate and Rhythm: Normal rate and regular rhythm  Pulses: Normal pulses  Heart sounds: Normal heart sounds  Pulmonary:      Effort: Pulmonary effort is normal  No respiratory distress  Breath sounds: Normal breath sounds  No wheezing  Abdominal:      General: Abdomen is flat  There is no distension  Palpations: Abdomen is soft  Tenderness: There is no abdominal tenderness  There is no guarding  Musculoskeletal:         General: Normal range of motion  Cervical back: Normal range of motion and neck supple  Neurological:      General: No focal deficit present  Mental Status: She is alert and oriented to person, place, and time  Mental status is at baseline  Skin:     General: Skin is warm and dry  Psychiatric:         Mood and Affect: Mood normal          Behavior: Behavior normal          Thought Content: Thought content normal          Judgment: Judgment normal    Vitals and nursing note reviewed

## 2023-02-01 LAB
LAB AP GYN PRIMARY INTERPRETATION: NORMAL
Lab: NORMAL

## (undated) DEVICE — TROCAR: Brand: KII FIOS FIRST ENTRY

## (undated) DEVICE — SUT VICRYL 0 UR-6 27 IN J603H

## (undated) DEVICE — INTENDED FOR TISSUE SEPARATION, AND OTHER PROCEDURES THAT REQUIRE A SHARP SURGICAL BLADE TO PUNCTURE OR CUT.: Brand: BARD-PARKER SAFETY BLADES SIZE 11, STERILE

## (undated) DEVICE — TROCAR: Brand: KII® SLEEVE

## (undated) DEVICE — NEEDLE 22 G X 1 1/2 SAFETY

## (undated) DEVICE — ADHESIVE SKIN HIGH VISCOSITY EXOFIN 1ML

## (undated) DEVICE — VIAL DECANTER

## (undated) DEVICE — DRAPE EQUIPMENT RF WAND

## (undated) DEVICE — GLOVE SRG BIOGEL ORTHOPEDIC 8

## (undated) DEVICE — HARMONIC 1100 SHEARS, 36CM SHAFT LENGTH: Brand: HARMONIC

## (undated) DEVICE — SUT MONOCRYL 4-0 PS-2 27 IN Y426H

## (undated) DEVICE — TOWEL SURG XR DETECT GREEN STRL RFD

## (undated) DEVICE — LIGAMAX 5 MM ENDOSCOPIC MULTIPLE CLIP APPLIER: Brand: LIGAMAX

## (undated) DEVICE — PACK PBDS LAP CHOLE RF